# Patient Record
Sex: MALE | Race: BLACK OR AFRICAN AMERICAN | NOT HISPANIC OR LATINO
[De-identification: names, ages, dates, MRNs, and addresses within clinical notes are randomized per-mention and may not be internally consistent; named-entity substitution may affect disease eponyms.]

---

## 2023-01-30 ENCOUNTER — NON-APPOINTMENT (OUTPATIENT)
Age: 43
End: 2023-01-30

## 2023-01-30 ENCOUNTER — APPOINTMENT (OUTPATIENT)
Dept: HEART AND VASCULAR | Facility: CLINIC | Age: 43
End: 2023-01-30
Payer: COMMERCIAL

## 2023-01-30 VITALS
OXYGEN SATURATION: 98 % | RESPIRATION RATE: 16 BRPM | DIASTOLIC BLOOD PRESSURE: 80 MMHG | TEMPERATURE: 97.6 F | SYSTOLIC BLOOD PRESSURE: 130 MMHG | BODY MASS INDEX: 23.36 KG/M2 | HEIGHT: 74 IN | HEART RATE: 95 BPM | WEIGHT: 182 LBS

## 2023-01-30 DIAGNOSIS — Z86.79 PERSONAL HISTORY OF OTHER DISEASES OF THE CIRCULATORY SYSTEM: ICD-10-CM

## 2023-01-30 DIAGNOSIS — F17.200 NICOTINE DEPENDENCE, UNSPECIFIED, UNCOMPLICATED: ICD-10-CM

## 2023-01-30 DIAGNOSIS — Z83.3 FAMILY HISTORY OF DIABETES MELLITUS: ICD-10-CM

## 2023-01-30 DIAGNOSIS — Z86.39 PERSONAL HISTORY OF OTHER ENDOCRINE, NUTRITIONAL AND METABOLIC DISEASE: ICD-10-CM

## 2023-01-30 DIAGNOSIS — R06.09 OTHER FORMS OF DYSPNEA: ICD-10-CM

## 2023-01-30 DIAGNOSIS — Z86.2 PERSONAL HISTORY OF DISEASES OF THE BLOOD AND BLOOD-FORMING ORGANS AND CERTAIN DISORDERS INVOLVING THE IMMUNE MECHANISM: ICD-10-CM

## 2023-01-30 DIAGNOSIS — Z78.9 OTHER SPECIFIED HEALTH STATUS: ICD-10-CM

## 2023-01-30 DIAGNOSIS — Z82.49 FAMILY HISTORY OF ISCHEMIC HEART DISEASE AND OTHER DISEASES OF THE CIRCULATORY SYSTEM: ICD-10-CM

## 2023-01-30 DIAGNOSIS — U07.1 COVID-19: ICD-10-CM

## 2023-01-30 PROCEDURE — 99205 OFFICE O/P NEW HI 60 MIN: CPT

## 2023-01-30 PROCEDURE — 93000 ELECTROCARDIOGRAM COMPLETE: CPT

## 2023-01-30 PROCEDURE — 93246 EXT ECG>7D<15D RECORDING: CPT

## 2023-02-01 ENCOUNTER — NON-APPOINTMENT (OUTPATIENT)
Age: 43
End: 2023-02-01

## 2023-02-01 PROBLEM — Z86.79 HISTORY OF CARDIAC MURMUR: Status: RESOLVED | Noted: 2023-02-01 | Resolved: 2023-02-01

## 2023-02-01 PROBLEM — U07.1 COVID-19: Status: RESOLVED | Noted: 2023-02-01 | Resolved: 2023-02-01

## 2023-02-01 PROBLEM — Z86.2 HISTORY OF ANEMIA: Status: RESOLVED | Noted: 2023-02-01 | Resolved: 2023-02-01

## 2023-02-01 PROBLEM — Z86.39 HISTORY OF VITAMIN D DEFICIENCY: Status: RESOLVED | Noted: 2023-02-01 | Resolved: 2023-02-01

## 2023-02-01 PROBLEM — R06.09 DYSPNEA ON EXERTION: Status: ACTIVE | Noted: 2023-02-01

## 2023-02-01 NOTE — HISTORY OF PRESENT ILLNESS
[FreeTextEntry1] : Delfino Roberts is a 44 yo male who presents for CV evaluation.\par \par He has been experiencing "heart squeezing" sensation intermittently. He has also noted inc in VANG. He experiences palps ("hard beats" and "fast beats") post meals.  He denies pnd, orthopnea, edema, or loc.\par \par He is active (not recently).  He takes no prescribed medications (blackseed and sea gates supplement).  He uses marijuana frequently.\par \par ECG today reveals NSR.\par \par Clinical hx reviewed in detail.\par \par Recommendations:\par 1. hydrate\par 2. exercise\par 3. ZIO\par 4. EXSE\par 5. CPET\par 6. carotid duplex

## 2023-02-01 NOTE — DISCUSSION/SUMMARY
[Possible Cardiac Ischemia (Intermd Prob)] : possible cardiac ischemia (intermediate probability) [Non-Cardiac] : non-cardiac chest pain [Rhythm Disorder] : rhythm disorder [Stable] : stable [None] : none [Low Sodium Diet] : low sodium diet [Patient] : the patient [de-identified] : VANG

## 2023-02-08 PROCEDURE — 93248 EXT ECG>7D<15D REV&INTERPJ: CPT

## 2023-02-09 ENCOUNTER — NON-APPOINTMENT (OUTPATIENT)
Age: 43
End: 2023-02-09

## 2023-02-24 ENCOUNTER — NON-APPOINTMENT (OUTPATIENT)
Age: 43
End: 2023-02-24

## 2023-03-15 ENCOUNTER — APPOINTMENT (OUTPATIENT)
Dept: HEART AND VASCULAR | Facility: CLINIC | Age: 43
End: 2023-03-15
Payer: COMMERCIAL

## 2023-03-15 VITALS
OXYGEN SATURATION: 97 % | HEART RATE: 53 BPM | WEIGHT: 182 LBS | SYSTOLIC BLOOD PRESSURE: 124 MMHG | HEIGHT: 74 IN | DIASTOLIC BLOOD PRESSURE: 76 MMHG | BODY MASS INDEX: 23.36 KG/M2

## 2023-03-15 PROCEDURE — 93351 STRESS TTE COMPLETE: CPT

## 2023-03-15 PROCEDURE — 93880 EXTRACRANIAL BILAT STUDY: CPT

## 2023-03-15 PROCEDURE — 93320 DOPPLER ECHO COMPLETE: CPT

## 2023-03-15 PROCEDURE — 93325 DOPPLER ECHO COLOR FLOW MAPG: CPT

## 2023-03-28 ENCOUNTER — APPOINTMENT (OUTPATIENT)
Dept: HEART AND VASCULAR | Facility: CLINIC | Age: 43
End: 2023-03-28
Payer: COMMERCIAL

## 2023-03-28 VITALS
HEIGHT: 74 IN | SYSTOLIC BLOOD PRESSURE: 110 MMHG | HEART RATE: 75 BPM | BODY MASS INDEX: 23.1 KG/M2 | DIASTOLIC BLOOD PRESSURE: 70 MMHG | WEIGHT: 180 LBS

## 2023-03-28 PROCEDURE — 94010 BREATHING CAPACITY TEST: CPT | Mod: 59

## 2023-03-28 PROCEDURE — 94621 CARDIOPULM EXERCISE TESTING: CPT

## 2023-03-28 PROCEDURE — 94729 DIFFUSING CAPACITY: CPT

## 2023-03-28 PROCEDURE — 94727 GAS DIL/WSHOT DETER LNG VOL: CPT

## 2023-10-30 ENCOUNTER — APPOINTMENT (OUTPATIENT)
Dept: HEART AND VASCULAR | Facility: CLINIC | Age: 43
End: 2023-10-30
Payer: COMMERCIAL

## 2023-10-30 ENCOUNTER — LABORATORY RESULT (OUTPATIENT)
Age: 43
End: 2023-10-30

## 2023-10-30 VITALS
BODY MASS INDEX: 23.36 KG/M2 | SYSTOLIC BLOOD PRESSURE: 116 MMHG | RESPIRATION RATE: 16 BRPM | TEMPERATURE: 97.8 F | HEIGHT: 74 IN | HEART RATE: 80 BPM | WEIGHT: 182 LBS | OXYGEN SATURATION: 97 % | DIASTOLIC BLOOD PRESSURE: 66 MMHG

## 2023-10-30 DIAGNOSIS — R00.2 PALPITATIONS: ICD-10-CM

## 2023-10-30 DIAGNOSIS — Z00.00 ENCOUNTER FOR GENERAL ADULT MEDICAL EXAMINATION W/OUT ABNORMAL FINDINGS: ICD-10-CM

## 2023-10-30 PROCEDURE — 99215 OFFICE O/P EST HI 40 MIN: CPT

## 2023-10-30 RX ORDER — METOPROLOL SUCCINATE 50 MG/1
50 TABLET, EXTENDED RELEASE ORAL
Qty: 2 | Refills: 0 | Status: DISCONTINUED | COMMUNITY
Start: 2023-05-08 | End: 2023-10-30

## 2023-10-31 LAB
ALBUMIN SERPL ELPH-MCNC: 4.6 G/DL
ALP BLD-CCNC: 48 U/L
ALT SERPL-CCNC: 14 U/L
ANION GAP SERPL CALC-SCNC: 13 MMOL/L
AST SERPL-CCNC: 16 U/L
BASOPHILS # BLD AUTO: 0.06 K/UL
BASOPHILS NFR BLD AUTO: 0.9 %
BILIRUB SERPL-MCNC: 0.8 MG/DL
BUN SERPL-MCNC: 14 MG/DL
CALCIUM SERPL-MCNC: 10.1 MG/DL
CHLORIDE SERPL-SCNC: 103 MMOL/L
CHOLEST SERPL-MCNC: 172 MG/DL
CO2 SERPL-SCNC: 24 MMOL/L
CREAT SERPL-MCNC: 0.91 MG/DL
EGFR: 107 ML/MIN/1.73M2
EOSINOPHIL # BLD AUTO: 0.08 K/UL
EOSINOPHIL NFR BLD AUTO: 1.2 %
ESTIMATED AVERAGE GLUCOSE: 100 MG/DL
FERRITIN SERPL-MCNC: 297 NG/ML
GLUCOSE SERPL-MCNC: 92 MG/DL
HBA1C MFR BLD HPLC: 5.1 %
HCT VFR BLD CALC: 40.2 %
HDLC SERPL-MCNC: 49 MG/DL
HGB BLD-MCNC: 12.3 G/DL
IMM GRANULOCYTES NFR BLD AUTO: 0.3 %
IRON SATN MFR SERPL: 28 %
IRON SERPL-MCNC: 98 UG/DL
LDLC SERPL CALC-MCNC: 110 MG/DL
LYMPHOCYTES # BLD AUTO: 1.64 K/UL
LYMPHOCYTES NFR BLD AUTO: 24.9 %
MAN DIFF?: NORMAL
MCHC RBC-ENTMCNC: 21.7 PG
MCHC RBC-ENTMCNC: 30.6 GM/DL
MCV RBC AUTO: 70.9 FL
MONOCYTES # BLD AUTO: 0.45 K/UL
MONOCYTES NFR BLD AUTO: 6.8 %
NEUTROPHILS # BLD AUTO: 4.33 K/UL
NEUTROPHILS NFR BLD AUTO: 65.9 %
NONHDLC SERPL-MCNC: 122 MG/DL
PLATELET # BLD AUTO: 305 K/UL
POTASSIUM SERPL-SCNC: 3.9 MMOL/L
PROT SERPL-MCNC: 7.8 G/DL
RBC # BLD: 5.67 M/UL
RBC # FLD: 15.3 %
SODIUM SERPL-SCNC: 139 MMOL/L
T3FREE SERPL-MCNC: 2.59 PG/ML
T3RU NFR SERPL: 1.1 TBI
T4 SERPL-MCNC: 5.5 UG/DL
TIBC SERPL-MCNC: 344 UG/DL
TRIGL SERPL-MCNC: 64 MG/DL
TSH SERPL-ACNC: 1.16 UIU/ML
UIBC SERPL-MCNC: 246 UG/DL
WBC # FLD AUTO: 6.58 K/UL

## 2023-11-01 LAB — APO LP(A) SERPL-MCNC: 25 NMOL/L

## 2023-11-07 PROBLEM — R00.2 PALPITATIONS: Status: ACTIVE | Noted: 2023-02-01

## 2023-11-09 ENCOUNTER — NON-APPOINTMENT (OUTPATIENT)
Age: 43
End: 2023-11-09

## 2023-12-05 ENCOUNTER — RESULT REVIEW (OUTPATIENT)
Age: 43
End: 2023-12-05

## 2023-12-19 ENCOUNTER — NON-APPOINTMENT (OUTPATIENT)
Age: 43
End: 2023-12-19

## 2024-02-05 NOTE — HISTORY OF PRESENT ILLNESS
[FreeTextEntry1] : 44 year old male with a past medical history with NSVT, presents for evaluation and management of aberrant RCA with chest pain.  Patient is under the care of Dr. Virgil Morrissey.  Patient reports VANG, chest discomfort, and palpitations.   Exercise stress echo 3/15/23 EF 66%.aortic valve is tricuspid with normal structure without stenosis. The peak transaortic velocity is 1.11 m/s. no AI.   1.5 mm of horizontal ST segment depression in leads II, III and aVF, V4 and V5 during the peak stress period.   CTA heart 12/5/23  Cardiac:  1.  The calcium score is 0 Agatston units.  2.   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 The vessel has an acute-angle take-off, with a slit-like opening at the ostium, measuring 1.5 mm in diameter. The vessel takes an inter-arterial course to reach the right atrioventricular groove.  3.  No evidence of coronary atherosclerosis.   Non-cardiac:  12 mm homogeneously enhancing left hepatic lobe lesion. If clinically warranted, follow-up MR can be performed for further more specific characterization of this lesion.

## 2024-02-05 NOTE — DATA REVIEWED
[FreeTextEntry1] : Telemetry 1/2023: APC/PVC; SR/ST - sx: SVT CPET 3/2023: ischemic myocardial dysfxn; 63% predicted peak VO2 EXSE 3/2023: nl lv sys fxn; nl simon fxn; mild MR; 12:30 min Anup; pos ECG; no ischemia by WM Carotid Duplex 3/2023: min disease b/l

## 2024-02-07 ENCOUNTER — APPOINTMENT (OUTPATIENT)
Dept: CARDIOTHORACIC SURGERY | Facility: CLINIC | Age: 44
End: 2024-02-07
Payer: COMMERCIAL

## 2024-02-07 ENCOUNTER — NON-APPOINTMENT (OUTPATIENT)
Age: 44
End: 2024-02-07

## 2024-02-07 VITALS
HEART RATE: 87 BPM | RESPIRATION RATE: 16 BRPM | HEIGHT: 74 IN | WEIGHT: 175 LBS | DIASTOLIC BLOOD PRESSURE: 66 MMHG | SYSTOLIC BLOOD PRESSURE: 114 MMHG | BODY MASS INDEX: 22.46 KG/M2 | OXYGEN SATURATION: 97 %

## 2024-02-07 DIAGNOSIS — R07.89 OTHER CHEST PAIN: ICD-10-CM

## 2024-02-07 PROCEDURE — 99204 OFFICE O/P NEW MOD 45 MIN: CPT

## 2024-02-08 ENCOUNTER — APPOINTMENT (OUTPATIENT)
Dept: HEART AND VASCULAR | Facility: CLINIC | Age: 44
End: 2024-02-08
Payer: COMMERCIAL

## 2024-02-08 VITALS
WEIGHT: 175 LBS | DIASTOLIC BLOOD PRESSURE: 76 MMHG | BODY MASS INDEX: 22.46 KG/M2 | HEIGHT: 74 IN | RESPIRATION RATE: 16 BRPM | TEMPERATURE: 97.6 F | SYSTOLIC BLOOD PRESSURE: 110 MMHG | OXYGEN SATURATION: 98 % | HEART RATE: 85 BPM

## 2024-02-08 DIAGNOSIS — R94.39 ABNORMAL RESULT OF OTHER CARDIOVASCULAR FUNCTION STUDY: ICD-10-CM

## 2024-02-08 DIAGNOSIS — I25.5 ISCHEMIC CARDIOMYOPATHY: ICD-10-CM

## 2024-02-08 DIAGNOSIS — I47.10 SUPRAVENTRICULAR TACHYCARDIA, UNSPECIFIED: ICD-10-CM

## 2024-02-08 DIAGNOSIS — I34.0 NONRHEUMATIC MITRAL (VALVE) INSUFFICIENCY: ICD-10-CM

## 2024-02-08 DIAGNOSIS — I65.29 OCCLUSION AND STENOSIS OF UNSPECIFIED CAROTID ARTERY: ICD-10-CM

## 2024-02-08 PROBLEM — R07.89 CHEST PRESSURE: Status: ACTIVE | Noted: 2023-02-01

## 2024-02-08 PROCEDURE — 99215 OFFICE O/P EST HI 40 MIN: CPT

## 2024-02-08 RX ORDER — METOPROLOL SUCCINATE 100 MG/1
100 TABLET, EXTENDED RELEASE ORAL
Qty: 2 | Refills: 0 | Status: DISCONTINUED | COMMUNITY
Start: 2023-11-01 | End: 2024-02-08

## 2024-02-08 NOTE — PHYSICAL EXAM
[General Appearance - Alert] : alert [General Appearance - In No Acute Distress] : in no acute distress [Sclera] : the sclera and conjunctiva were normal [Outer Ear] : the ears and nose were normal in appearance [Neck Appearance] : the appearance of the neck was normal [] : the neck was supple [Jugular Venous Distention Increased] : there was no jugular-venous distention [Respiration, Rhythm And Depth] : normal respiratory rhythm and effort [Exaggerated Use Of Accessory Muscles For Inspiration] : no accessory muscle use [Auscultation Breath Sounds / Voice Sounds] : lungs were clear to auscultation bilaterally [Heart Rate And Rhythm] : heart rate was normal and rhythm regular [Heart Sounds] : normal S1 and S2 [Heart Sounds Gallop] : no gallops [Murmurs] : no murmurs [Examination Of The Chest] : the chest was normal in appearance [2+] : left 2+ [Bowel Sounds] : normal bowel sounds [Abdomen Soft] : soft [No CVA Tenderness] : no ~M costovertebral angle tenderness [Abnormal Walk] : normal gait [Skin Color & Pigmentation] : normal skin color and pigmentation [No Focal Deficits] : no focal deficits [Oriented To Time, Place, And Person] : oriented to person, place, and time

## 2024-02-11 PROBLEM — I47.10 NONSUSTAINED SUPRAVENTRICULAR TACHYCARDIA: Status: ACTIVE | Noted: 2023-03-28

## 2024-02-11 PROBLEM — R94.39 ABNORMAL STRESS ECG: Status: ACTIVE | Noted: 2023-03-28

## 2024-02-11 PROBLEM — I34.0 MILD MITRAL REGURGITATION: Status: ACTIVE | Noted: 2023-03-28

## 2024-02-11 PROBLEM — I65.29 CAROTID ARTERY PLAQUE: Status: ACTIVE | Noted: 2023-10-29

## 2024-02-11 PROBLEM — I25.5 ISCHEMIC MYOCARDIAL DYSFUNCTION: Status: ACTIVE | Noted: 2023-03-28

## 2024-02-11 NOTE — REASON FOR VISIT
[Structural Heart and Valve Disease] : structural heart and valve disease [Arrhythmia/ECG Abnorrmalities] : arrhythmia/ECG abnormalities [Hyperlipidemia] : hyperlipidemia [Coronary Artery Disease] : coronary artery disease [Carotid, Aortic and Peripheral Vascular Disease] : carotid, aortic and peripheral vascular disease [FreeTextEntry3] : Magnolia Rubio

## 2024-02-11 NOTE — DISCUSSION/SUMMARY
[Coronary Artery Disease] : coronary artery disease [Non-Cardiac] : non-cardiac symptoms [Possible Cardiac Ischemia (Intermd Prob)] : possible cardiac ischemia (intermediate probability) [Dietary Modification] : dietary modification [Exercise Regimen] : an exercise regimen [Hyperlipidemia] : hyperlipidemia [Diet Modification] : diet modification [Exercise] : exercise [Compensated] : compensated [Mild Mitral Regurgitation] : mild mitral regurgitation [Sodium Restriction] : sodium restriction [Sinus Tachycardia] : sinus tachycardia [PVCs] : ectopic ventricular beats [SVT] : paroxysmal supraventricular tachycardia [Stable] : stable [None] : There are no changes in medication management [Patient] : the patient [Exercise Rehab] : exercise rehabilitation [de-identified] : min carotid disease b/l [de-identified] : anomalous RCA

## 2024-02-11 NOTE — HISTORY OF PRESENT ILLNESS
[FreeTextEntry1] : Delfino Roberts returns for follow up.    The intermittent "heart squeezing" sensation is better.  The VANG and palps are improved.  He denies pnd, orthopnea, edema, or loc.  He is active.  He takes no prescribed medications (blackseed and sea gates supplement).  He uses marijuana frequently.  Telemetry 1/2023: APC/PVC; SR/ST - sx: SVT CPET 3/2023: ischemic myocardial dysfxn; 63% predicted peak VO2 EXSE 3/2023: nl lv sys fxn; nl simon fxn; mild MR; 12:30 min Anup; pos ECG; no ischemia by WM Carotid Duplex 3/2023: min disease b/l CTA 12/2023: calcium score 0; nl LAD/CX; anomalous RCA - L cusp take-off - slit like orifice and inter arterial course; 12 mm liver lesion  Clinical hx and results reviewed in detail.  Recommendations: 1. hydrate 2. exercise 3. will consider lipid lowering strategies; prevention and wellness counseling provided. 4. CTS eval with Dr Verde noted - encouraged pt to proceed with surgical intervention to address anomalous RCA. 5. primary care follow up re 12 mm liver lesion

## 2024-02-11 NOTE — PHYSICAL EXAM
[Well Nourished] : well nourished [Well Developed] : well developed [No Acute Distress] : no acute distress [Normal Conjunctiva] : normal conjunctiva [No Carotid Bruit] : no carotid bruit [Normal Venous Pressure] : normal venous pressure [Normal S1, S2] : normal S1, S2 [No Gallop] : no gallop [No Rub] : no rub [Clear Lung Fields] : clear lung fields [Murmur] : murmur [No Respiratory Distress] : no respiratory distress  [Good Air Entry] : good air entry [Non Tender] : non-tender [Soft] : abdomen soft [Normal Bowel Sounds] : normal bowel sounds [No Masses/organomegaly] : no masses/organomegaly [Normal Gait] : normal gait [No Cyanosis] : no cyanosis [No Edema] : no edema [No Varicosities] : no varicosities [No Clubbing] : no clubbing [No Skin Lesions] : no skin lesions [No Rash] : no rash [No Focal Deficits] : no focal deficits [Moves all extremities] : moves all extremities [Normal memory] : normal memory [Alert and Oriented] : alert and oriented [Normal Speech] : normal speech [de-identified] : soft sys murmur

## 2024-02-12 ENCOUNTER — NON-APPOINTMENT (OUTPATIENT)
Age: 44
End: 2024-02-12

## 2024-02-21 VITALS
WEIGHT: 175.05 LBS | TEMPERATURE: 98 F | SYSTOLIC BLOOD PRESSURE: 117 MMHG | DIASTOLIC BLOOD PRESSURE: 72 MMHG | RESPIRATION RATE: 16 BRPM | OXYGEN SATURATION: 98 % | HEART RATE: 63 BPM | HEIGHT: 74 IN

## 2024-02-21 RX ORDER — CHLORHEXIDINE GLUCONATE 213 G/1000ML
1 SOLUTION TOPICAL ONCE
Refills: 0 | Status: COMPLETED | OUTPATIENT
Start: 2024-02-26 | End: 2024-02-27

## 2024-02-21 NOTE — H&P ADULT - ASSESSMENT
44 YOM, current marijuana user, PMHx NSVT, HLD, anomalous RCA  who initially presented to outpatient cardiologist Dr. Morrissey  c/o an episode of chest pressure, palpitations, VANG in January 2023 during a trip in [place] after the patient used illicit drugs, drank strong coffee and had lack of sleep. Sx resolved after 1 hour on its own. Patient also reports that during times of exercise he feels chest pressure and palpitations. Patient was referred to Dr. Verde after incidental finding of anomalous RCA on CCTA. In light of patient's risk factors, abnormal CCTA results, and known anomalous RCA patient is referred to [facility] for pre-op cardiac catheterization w/ possible intervention if clinically indicated before scheduled surgery with Dr. Verde.       EKG: 	NSR 67bpm no ischemia		  ASA 	I  Mallampati class: III  Anginal Class: III     -No Known Allergies    -H/H = 11.6/38.8  . Pt denies BRBPR, hematuria, hematochezia, melena. No load as pt is planned for surgery   EF 66%. Euvolemic on exam. IV NS @ 250cc bolus followed by 75cc/hr x 2hrs started pre procedure    Sedation Plan:   Moderate  Patient Is Suitable Candidate For Sedation?     Yes    Risks & benefits of procedure and alternative therapy have been explained to the patient including but not limited to: allergic reaction, bleeding with possible need for blood transfusion, infection, renal and vascular compromise, limb damage, arrhythmia, stroke, vessel dissection/perforation, myocardial infarction, and emergent CABG. Informed consent obtained at bedside and included in chart.

## 2024-02-21 NOTE — H&P ADULT - NSICDXPASTMEDICALHX_GEN_ALL_CORE_FT
PAST MEDICAL HISTORY:  Anomalous right coronary artery     History of anemia     HLD (hyperlipidemia)

## 2024-02-21 NOTE — H&P ADULT - NSHPLABSRESULTS_GEN_ALL_CORE
11.6   6.81  )-----------( 272      ( 26 Feb 2024 07:48 )             38.8               PT/INR - ( 26 Feb 2024 07:48 )   PT: 9.8 sec;   INR: 0.86          PTT - ( 26 Feb 2024 07:48 )  PTT:32.8 sec

## 2024-02-21 NOTE — H&P ADULT - HISTORY OF PRESENT ILLNESS
Cards: Dr. Verde/ Dr. Virgil Morrissey   Pharmacy:   Escort:     *****PRE-OP******     Patient is a 44 YOM, current smoker and marijuana user, with pmhx of NSVT, HLD, anomalous RCA  who initially presented to outpatient cardiologist Dr. Morrissey  c/o an episode of chest pressure, palpitations, VANG in January 2023 during a trip in [place] after the patient used illicit drugs, drank strong coffee and had lack of sleep. Sx resolved after 1 hour on its own. Patient also reports that during times of exercise he feels chest pressure and palpitations. Patient was referred to Dr. Verde after incidental finding of anomalous RCA on CCTA. Today patient denies _____chest pain, SOB, VANG, palpitations, dizziness, LOC, N/V/D, fever/chills/sick contact, diaphoresis, orthopnea/PND, and leg swelling.    Exercise Stress Echo 3/15/23: EF 65%, aortic valve is tricuspid, 1.5mm ST depression in leads II, III and avF, V4 and V5 during peak stress period   CCTA 12/5/23: calcium score 0, RCA has an anomalous take off from the left coronary cusp, no evidence of coronary atherosclerosis   TTE 3/15/23: LVSF normal with EF 66%, mild MR, mildly dilated LA.     In light of patient's risk factors, abnormal CCTA results, and known anomalous RCA patient is referred to [facility] for pre-op cardiac catheterization w/ possible intervention if clinically indicated before scheduled surgery with Dr. Verde.        Cards: Dr. Verde/ Dr. Virgil Morrissey   Escort: pre-op -- will be admitted to [facility] post cath    *****PRE-OP******     44 YOM, current marijuana user, PMHx NSVT, HLD, anomalous RCA  who initially presented to outpatient cardiologist Dr. Morrissey  c/o an episode of chest pressure, palpitations, VANG in January 2023 during a trip in [place] after the patient used illicit drugs, drank strong coffee and had lack of sleep. Sx resolved after 1 hour on its own. Patient also reports that during times of exercise he feels chest pressure and palpitations. Patient was referred to Dr. Verde after incidental finding of anomalous RCA on CCTA. Today patient denies chest pain, SOB, VANG, palpitations, dizziness, LOC, N/V/D, fever/chills/sick contact, diaphoresis, orthopnea/PND, and leg swelling.    Exercise Stress Echo 3/15/23: EF 65%, aortic valve is tricuspid, 1.5mm ST depression in leads II, III and avF, V4 and V5 during peak stress period   CCTA 12/5/23: calcium score 0, RCA has an anomalous take off from the left coronary cusp, no evidence of coronary atherosclerosis   TTE 3/15/23: LVSF normal with EF 66%, mild MR, mildly dilated LA.     In light of patient's risk factors, abnormal CCTA results, and known anomalous RCA patient is referred to [facility] for pre-op cardiac catheterization w/ possible intervention if clinically indicated before scheduled surgery with Dr. Verde.

## 2024-02-25 ENCOUNTER — TRANSCRIPTION ENCOUNTER (OUTPATIENT)
Age: 44
End: 2024-02-25

## 2024-02-26 ENCOUNTER — INPATIENT (INPATIENT)
Facility: HOSPITAL | Age: 44
LOS: 4 days | Discharge: HOME | DRG: 253 | End: 2024-03-02
Attending: THORACIC SURGERY (CARDIOTHORACIC VASCULAR SURGERY) | Admitting: THORACIC SURGERY (CARDIOTHORACIC VASCULAR SURGERY)
Payer: COMMERCIAL

## 2024-02-26 LAB
A1C WITH ESTIMATED AVERAGE GLUCOSE RESULT: 4.7 % — SIGNIFICANT CHANGE UP (ref 4–5.6)
ADD ON TEST-SPECIMEN IN LAB: SIGNIFICANT CHANGE UP
ADD ON TEST-SPECIMEN IN LAB: SIGNIFICANT CHANGE UP
ALBUMIN SERPL ELPH-MCNC: 4.4 G/DL — SIGNIFICANT CHANGE UP (ref 3.3–5)
ALP SERPL-CCNC: 62 U/L — SIGNIFICANT CHANGE UP (ref 40–120)
ALT FLD-CCNC: 23 U/L — SIGNIFICANT CHANGE UP (ref 10–45)
AMPHET UR-MCNC: NEGATIVE — SIGNIFICANT CHANGE UP
ANION GAP SERPL CALC-SCNC: 11 MMOL/L — SIGNIFICANT CHANGE UP (ref 5–17)
ANISOCYTOSIS BLD QL: SLIGHT — SIGNIFICANT CHANGE UP
APPEARANCE UR: CLEAR — SIGNIFICANT CHANGE UP
APTT BLD: 32.8 SEC — SIGNIFICANT CHANGE UP (ref 24.5–35.6)
AST SERPL-CCNC: 17 U/L — SIGNIFICANT CHANGE UP (ref 10–40)
BARBITURATES UR SCN-MCNC: NEGATIVE — SIGNIFICANT CHANGE UP
BASOPHILS # BLD AUTO: 0 K/UL — SIGNIFICANT CHANGE UP (ref 0–0.2)
BASOPHILS NFR BLD AUTO: 0 % — SIGNIFICANT CHANGE UP (ref 0–2)
BENZODIAZ UR-MCNC: POSITIVE
BILIRUB SERPL-MCNC: 0.5 MG/DL — SIGNIFICANT CHANGE UP (ref 0.2–1.2)
BILIRUB UR-MCNC: NEGATIVE — SIGNIFICANT CHANGE UP
BLD GP AB SCN SERPL QL: NEGATIVE — SIGNIFICANT CHANGE UP
BUN SERPL-MCNC: 13 MG/DL — SIGNIFICANT CHANGE UP (ref 7–23)
CALCIUM SERPL-MCNC: 9.7 MG/DL — SIGNIFICANT CHANGE UP (ref 8.4–10.5)
CHLORIDE SERPL-SCNC: 105 MMOL/L — SIGNIFICANT CHANGE UP (ref 96–108)
CHOLEST SERPL-MCNC: 164 MG/DL — SIGNIFICANT CHANGE UP
CK MB CFR SERPL CALC: 1 NG/ML — SIGNIFICANT CHANGE UP (ref 0–6.7)
CK SERPL-CCNC: 123 U/L — SIGNIFICANT CHANGE UP (ref 30–200)
CO2 SERPL-SCNC: 25 MMOL/L — SIGNIFICANT CHANGE UP (ref 22–31)
COCAINE METAB.OTHER UR-MCNC: NEGATIVE — SIGNIFICANT CHANGE UP
COLOR SPEC: YELLOW — SIGNIFICANT CHANGE UP
CREAT SERPL-MCNC: 1.03 MG/DL — SIGNIFICANT CHANGE UP (ref 0.5–1.3)
DIFF PNL FLD: NEGATIVE — SIGNIFICANT CHANGE UP
EGFR: 92 ML/MIN/1.73M2 — SIGNIFICANT CHANGE UP
EOSINOPHIL # BLD AUTO: 0.18 K/UL — SIGNIFICANT CHANGE UP (ref 0–0.5)
EOSINOPHIL NFR BLD AUTO: 2.6 % — SIGNIFICANT CHANGE UP (ref 0–6)
ESTIMATED AVERAGE GLUCOSE: 88 MG/DL — SIGNIFICANT CHANGE UP (ref 68–114)
GLUCOSE SERPL-MCNC: 105 MG/DL — HIGH (ref 70–99)
GLUCOSE UR QL: NEGATIVE MG/DL — SIGNIFICANT CHANGE UP
HCT VFR BLD CALC: 38.8 % — LOW (ref 39–50)
HDLC SERPL-MCNC: 40 MG/DL — LOW
HGB BLD-MCNC: 11.6 G/DL — LOW (ref 13–17)
HYPOCHROMIA BLD QL: SLIGHT — SIGNIFICANT CHANGE UP
INR BLD: 0.86 — SIGNIFICANT CHANGE UP (ref 0.85–1.18)
KETONES UR-MCNC: NEGATIVE MG/DL — SIGNIFICANT CHANGE UP
LEUKOCYTE ESTERASE UR-ACNC: NEGATIVE — SIGNIFICANT CHANGE UP
LIPID PNL WITH DIRECT LDL SERPL: 106 MG/DL — HIGH
LYMPHOCYTES # BLD AUTO: 1.01 K/UL — SIGNIFICANT CHANGE UP (ref 1–3.3)
LYMPHOCYTES # BLD AUTO: 14.8 % — SIGNIFICANT CHANGE UP (ref 13–44)
MAGNESIUM SERPL-MCNC: 2.1 MG/DL — SIGNIFICANT CHANGE UP (ref 1.6–2.6)
MANUAL SMEAR VERIFICATION: SIGNIFICANT CHANGE UP
MCHC RBC-ENTMCNC: 21.5 PG — LOW (ref 27–34)
MCHC RBC-ENTMCNC: 29.9 GM/DL — LOW (ref 32–36)
MCV RBC AUTO: 71.9 FL — LOW (ref 80–100)
METHADONE UR-MCNC: NEGATIVE — SIGNIFICANT CHANGE UP
MICROCYTES BLD QL: SLIGHT — SIGNIFICANT CHANGE UP
MONOCYTES # BLD AUTO: 0.24 K/UL — SIGNIFICANT CHANGE UP (ref 0–0.9)
MONOCYTES NFR BLD AUTO: 3.5 % — SIGNIFICANT CHANGE UP (ref 2–14)
NEUTROPHILS # BLD AUTO: 5.39 K/UL — SIGNIFICANT CHANGE UP (ref 1.8–7.4)
NEUTROPHILS NFR BLD AUTO: 79.1 % — HIGH (ref 43–77)
NITRITE UR-MCNC: NEGATIVE — SIGNIFICANT CHANGE UP
NON HDL CHOLESTEROL: 124 MG/DL — SIGNIFICANT CHANGE UP
NT-PROBNP SERPL-SCNC: <36 PG/ML — SIGNIFICANT CHANGE UP (ref 0–300)
OPIATES UR-MCNC: NEGATIVE — SIGNIFICANT CHANGE UP
OVALOCYTES BLD QL SMEAR: SLIGHT — SIGNIFICANT CHANGE UP
PCP SPEC-MCNC: SIGNIFICANT CHANGE UP
PCP UR-MCNC: NEGATIVE — SIGNIFICANT CHANGE UP
PH UR: 6.5 — SIGNIFICANT CHANGE UP (ref 5–8)
PLAT MORPH BLD: NORMAL — SIGNIFICANT CHANGE UP
PLATELET # BLD AUTO: 272 K/UL — SIGNIFICANT CHANGE UP (ref 150–400)
POIKILOCYTOSIS BLD QL AUTO: SIGNIFICANT CHANGE UP
POLYCHROMASIA BLD QL SMEAR: SLIGHT — SIGNIFICANT CHANGE UP
POTASSIUM SERPL-MCNC: 4.5 MMOL/L — SIGNIFICANT CHANGE UP (ref 3.5–5.3)
POTASSIUM SERPL-SCNC: 4.5 MMOL/L — SIGNIFICANT CHANGE UP (ref 3.5–5.3)
PROT SERPL-MCNC: 7.3 G/DL — SIGNIFICANT CHANGE UP (ref 6–8.3)
PROT UR-MCNC: NEGATIVE MG/DL — SIGNIFICANT CHANGE UP
PROTHROM AB SERPL-ACNC: 9.8 SEC — SIGNIFICANT CHANGE UP (ref 9.5–13)
RBC # BLD: 5.4 M/UL — SIGNIFICANT CHANGE UP (ref 4.2–5.8)
RBC # FLD: 14.2 % — SIGNIFICANT CHANGE UP (ref 10.3–14.5)
RBC BLD AUTO: ABNORMAL
RH IG SCN BLD-IMP: POSITIVE — SIGNIFICANT CHANGE UP
SODIUM SERPL-SCNC: 141 MMOL/L — SIGNIFICANT CHANGE UP (ref 135–145)
SP GR SPEC: >1.03 — HIGH (ref 1–1.03)
STOMATOCYTES BLD QL SMEAR: SIGNIFICANT CHANGE UP
THC UR QL: POSITIVE
TRIGL SERPL-MCNC: 90 MG/DL — SIGNIFICANT CHANGE UP
TSH SERPL-MCNC: 1.77 UIU/ML — SIGNIFICANT CHANGE UP (ref 0.27–4.2)
UROBILINOGEN FLD QL: 0.2 MG/DL — SIGNIFICANT CHANGE UP (ref 0.2–1)
WBC # BLD: 6.81 K/UL — SIGNIFICANT CHANGE UP (ref 3.8–10.5)
WBC # FLD AUTO: 6.81 K/UL — SIGNIFICANT CHANGE UP (ref 3.8–10.5)

## 2024-02-26 PROCEDURE — 93010 ELECTROCARDIOGRAM REPORT: CPT

## 2024-02-26 PROCEDURE — 71046 X-RAY EXAM CHEST 2 VIEWS: CPT | Mod: 26

## 2024-02-26 PROCEDURE — 93458 L HRT ARTERY/VENTRICLE ANGIO: CPT | Mod: 26

## 2024-02-26 PROCEDURE — 94010 BREATHING CAPACITY TEST: CPT | Mod: 26

## 2024-02-26 PROCEDURE — 93880 EXTRACRANIAL BILAT STUDY: CPT | Mod: 26

## 2024-02-26 PROCEDURE — 99152 MOD SED SAME PHYS/QHP 5/>YRS: CPT

## 2024-02-26 RX ORDER — METOPROLOL TARTRATE 50 MG
1 TABLET ORAL
Refills: 0 | DISCHARGE

## 2024-02-26 RX ORDER — SODIUM CHLORIDE 9 MG/ML
1000 INJECTION INTRAMUSCULAR; INTRAVENOUS; SUBCUTANEOUS
Refills: 0 | Status: DISCONTINUED | OUTPATIENT
Start: 2024-02-26 | End: 2024-02-28

## 2024-02-26 RX ORDER — SENNA PLUS 8.6 MG/1
2 TABLET ORAL AT BEDTIME
Refills: 0 | Status: DISCONTINUED | OUTPATIENT
Start: 2024-02-26 | End: 2024-02-27

## 2024-02-26 RX ORDER — CHLORHEXIDINE GLUCONATE 213 G/1000ML
1 SOLUTION TOPICAL ONCE
Refills: 0 | Status: COMPLETED | OUTPATIENT
Start: 2024-02-27 | End: 2024-02-27

## 2024-02-26 RX ORDER — POLYETHYLENE GLYCOL 3350 17 G/17G
17 POWDER, FOR SOLUTION ORAL DAILY
Refills: 0 | Status: DISCONTINUED | OUTPATIENT
Start: 2024-02-26 | End: 2024-02-27

## 2024-02-26 RX ORDER — CHLORHEXIDINE GLUCONATE 213 G/1000ML
10 SOLUTION TOPICAL ONCE
Refills: 0 | Status: COMPLETED | OUTPATIENT
Start: 2024-02-26 | End: 2024-02-27

## 2024-02-26 RX ORDER — SODIUM CHLORIDE 9 MG/ML
3 INJECTION INTRAMUSCULAR; INTRAVENOUS; SUBCUTANEOUS EVERY 8 HOURS
Refills: 0 | Status: DISCONTINUED | OUTPATIENT
Start: 2024-02-26 | End: 2024-02-27

## 2024-02-26 RX ORDER — SODIUM CHLORIDE 9 MG/ML
250 INJECTION INTRAMUSCULAR; INTRAVENOUS; SUBCUTANEOUS ONCE
Refills: 0 | Status: COMPLETED | OUTPATIENT
Start: 2024-02-26 | End: 2024-02-26

## 2024-02-26 RX ORDER — ACETAMINOPHEN 500 MG
650 TABLET ORAL EVERY 6 HOURS
Refills: 0 | Status: DISCONTINUED | OUTPATIENT
Start: 2024-02-26 | End: 2024-02-27

## 2024-02-26 RX ORDER — PANTOPRAZOLE SODIUM 20 MG/1
40 TABLET, DELAYED RELEASE ORAL
Refills: 0 | Status: DISCONTINUED | OUTPATIENT
Start: 2024-02-26 | End: 2024-02-27

## 2024-02-26 RX ORDER — HEPARIN SODIUM 5000 [USP'U]/ML
5000 INJECTION INTRAVENOUS; SUBCUTANEOUS EVERY 8 HOURS
Refills: 0 | Status: DISCONTINUED | OUTPATIENT
Start: 2024-02-26 | End: 2024-02-27

## 2024-02-26 RX ORDER — CHLORHEXIDINE GLUCONATE 213 G/1000ML
1 SOLUTION TOPICAL ONCE
Refills: 0 | Status: COMPLETED | OUTPATIENT
Start: 2024-02-26 | End: 2024-02-26

## 2024-02-26 RX ADMIN — SODIUM CHLORIDE 500 MILLILITER(S): 9 INJECTION INTRAMUSCULAR; INTRAVENOUS; SUBCUTANEOUS at 08:55

## 2024-02-26 RX ADMIN — HEPARIN SODIUM 5000 UNIT(S): 5000 INJECTION INTRAVENOUS; SUBCUTANEOUS at 21:53

## 2024-02-26 RX ADMIN — CHLORHEXIDINE GLUCONATE 1 APPLICATION(S): 213 SOLUTION TOPICAL at 21:57

## 2024-02-26 RX ADMIN — SODIUM CHLORIDE 3 MILLILITER(S): 9 INJECTION INTRAMUSCULAR; INTRAVENOUS; SUBCUTANEOUS at 20:40

## 2024-02-26 RX ADMIN — CHLORHEXIDINE GLUCONATE 1 APPLICATION(S): 213 SOLUTION TOPICAL at 20:15

## 2024-02-26 RX ADMIN — SODIUM CHLORIDE 75 MILLILITER(S): 9 INJECTION INTRAMUSCULAR; INTRAVENOUS; SUBCUTANEOUS at 09:40

## 2024-02-26 RX ADMIN — SODIUM CHLORIDE 225 MILLILITER(S): 9 INJECTION INTRAMUSCULAR; INTRAVENOUS; SUBCUTANEOUS at 13:36

## 2024-02-26 NOTE — PRE-ANESTHESIA EVALUATION ADULT - NSANTHPMHFT_GEN_ALL_CORE
44 YOM, current marijuana user, PMHx NSVT, HLD, anomalous RCA.     Episode of chest pressure, palpitations, VANG in January 2023 during a trip in [place] after the patient used illicit drugs, drank strong coffee and had lack of sleep. Sx resolved after 1 hour on its own. Patient also reports that during times of exercise he feels chest pressure and palpitations.     Incidental finding of anomalous RCA on CCTA.     Exercise Stress Echo 3/15/23: EF 65%, aortic valve is tricuspid, 1.5mm ST depression in leads II, III and avF, V4 and V5 during peak stress period     CCTA 12/5/23: calcium score 0, RCA has an anomalous take off from the left coronary cusp, no evidence of coronary atherosclerosis     TTE 3/15/23: LVSF normal with EF 66%, mild MR, mildly dilated LA.            Allergies:  	No Known Allergies      PAST MEDICAL HISTORY:  Anomalous right coronary artery   History of anemia   HLD (hyperlipidemia).     PAST SURGICAL HISTORY:  No significant past surgical history.

## 2024-02-26 NOTE — PROGRESS NOTE ADULT - SUBJECTIVE AND OBJECTIVE BOX
OPERATION & DATE: preop transposition of the RCA    SUBJECTIVE ASSESSMENT:  44yoM seen and examined. Patient seen in cath lab after procedure.    VITAL SIGNS:  Vital Signs Last 24 Hrs  T(C): --  T(F): --  HR: --  BP: --  BP(mean): --  RR: --  SpO2: --      I&O's Detail    PHYSICAL EXAM:  General:  HEENT:  Cardio:  Pulm:  GI:  Extremities:  Vascular:  Incisions:    LABS:                        11.6   6.81  )-----------( 272      ( 26 Feb 2024 07:48 )             38.8     PT/INR - ( 26 Feb 2024 07:48 )   PT: 9.8 sec;   INR: 0.86          PTT - ( 26 Feb 2024 07:48 )  PTT:32.8 sec  02-26    141  |  105  |  13  ----------------------------<  105<H>  4.5   |  25  |  1.03    Ca    9.7      26 Feb 2024 07:47  Mg     2.1     02-26    TPro  7.3  /  Alb  4.4  /  TBili  0.5  /  DBili  x   /  AST  17  /  ALT  23  /  AlkPhos  62  02-26    Urinalysis Basic - ( 26 Feb 2024 07:47 )    Color: x / Appearance: x / SG: x / pH: x  Gluc: 105 mg/dL / Ketone: x  / Bili: x / Urobili: x   Blood: x / Protein: x / Nitrite: x   Leuk Esterase: x / RBC: x / WBC x   Sq Epi: x / Non Sq Epi: x / Bacteria: x      MEDICATIONS  (STANDING):  chlorhexidine 4% Liquid 1 Application(s) Topical once  sodium chloride 0.9%. 1000 milliLiter(s) (75 mL/Hr) IV Continuous <Continuous>  sodium chloride 0.9%. 1000 milliLiter(s) (225 mL/Hr) IV Continuous <Continuous>    MEDICATIONS  (PRN):    RADIOLOGY & ADDITIONAL TESTS:     OPERATION & DATE: preop transposition of the RCA    SUBJECTIVE ASSESSMENT:  44yoM seen and examined. Patient seen in after cath lab procedure. Feels well, has no complaints. Denies CP, palpitations, SOB, abdominal pain, nausea, vomiting, fever, chills.    VITAL SIGNS:  Vital Signs Last 24 Hrs  T(C): --  T(F): --  HR: --  BP: --  BP(mean): --  RR: --  SpO2: --      I&O's Detail    PHYSICAL EXAM:  General: NAD, sitting comfortably in bed, conversing appropriately  Neurological: alert and oriented, UE and LE strength equal b/l, facial symmetry present  Cardiovascular: RRR, Clear S1 and S2, no murmurs appreciated  Respiratory: chest expansion symmetrical, CTA b/l, no wheezing noted  Gastrointestinal: +BS, soft, NT, ND  Extremities: moving spontaneously, no calf tenderness or edema.  Vascular: warm, well perfused. DP/PT pulses palpable b/l.  Incisions: R radial band in place    LABS:                        11.6   6.81  )-----------( 272      ( 26 Feb 2024 07:48 )             38.8     PT/INR - ( 26 Feb 2024 07:48 )   PT: 9.8 sec;   INR: 0.86          PTT - ( 26 Feb 2024 07:48 )  PTT:32.8 sec  02-26    141  |  105  |  13  ----------------------------<  105<H>  4.5   |  25  |  1.03    Ca    9.7      26 Feb 2024 07:47  Mg     2.1     02-26    TPro  7.3  /  Alb  4.4  /  TBili  0.5  /  DBili  x   /  AST  17  /  ALT  23  /  AlkPhos  62  02-26    Urinalysis Basic - ( 26 Feb 2024 07:47 )    Color: x / Appearance: x / SG: x / pH: x  Gluc: 105 mg/dL / Ketone: x  / Bili: x / Urobili: x   Blood: x / Protein: x / Nitrite: x   Leuk Esterase: x / RBC: x / WBC x   Sq Epi: x / Non Sq Epi: x / Bacteria: x      MEDICATIONS  (STANDING):  chlorhexidine 4% Liquid 1 Application(s) Topical once  sodium chloride 0.9%. 1000 milliLiter(s) (75 mL/Hr) IV Continuous <Continuous>  sodium chloride 0.9%. 1000 milliLiter(s) (225 mL/Hr) IV Continuous <Continuous>    MEDICATIONS  (PRN):    RADIOLOGY & ADDITIONAL TESTS:

## 2024-02-26 NOTE — PROGRESS NOTE ADULT - ASSESSMENT
44 yoM, current marijuana user, PMHx NSVT, HLD, anomalous RCA who initially presented to outpatient cardiologist, Dr. Morrissey, c/o an episode of chest pressure, palpitations, VANG in January 2023 during a trip in [place] after the patient used illicit drugs, drank strong coffee and had lack of sleep. Patient also reports that during times of exercise he feels chest pressure and palpitations. Patient was referred to Dr. Verde after incidental finding of anomalous RCA on CCTA. Exercise Stress Echo 3/15/23: EF 65%, aortic valve is tricuspid, 1.5mm ST depression in leads II, III and avF, V4 and V5 during peak stress period. CCTA 12/5/23: calcium score 0, RCA has an anomalous take off from the left coronary cusp, no evidence of coronary atherosclerosis. TTE 3/15/23: LVSF normal with EF 66%, mild MR, mildly dilated LA. He underwent cardiac cath on 2/26/24 which revealed normal coronaries. He is preop for transposition of the RCA tomorrow with Dr. Verde.     Plan:    Neurovascular:   -Pain well controlled with current regimen. PRN's: tylenol    Cardiovascular:   anomalous RCA, preop tomorrow for transposition of RCA  -pending PFTs, CXR, EKG, carotid US  -cardiac cath 2/26 w/ normal coronaries  -continue home metoprolol  -Hemodynamically stable.   -Monitor: BP, HR, tele    Respiratory:   -Oxygenating well on room air  -Encourage continued use of IS 10x/hr and frequent ambulation  -CXR: pending    GI:  -GI PPX: continue protonix  -PO Diet  -Bowel Regimen: senna/miralax    Renal / :  -Continue to monitor renal function: BUN/Cr: 13/1.03  -Monitor I/O's daily     Endocrine:    -No hx of DM or thyroid dx  -A1c: 4.7  -TSH: pending    Hematologic:  -CBC: H/H- 11.6/38.8  -Coagulation Panel.    ID:  -Temperature: afebrile   -CBC: WBC- 6.81    Prophylaxis:  -DVT prophylaxis with 5000 SubQ Heparin q8h.  -Continue with SCD's b/l while patient is at rest     Disposition:  -OR tomorrow 44 yoM, current marijuana user, PMHx NSVT, HLD, anomalous RCA who initially presented to outpatient cardiologist, Dr. Morrissey, c/o an episode of chest pressure, palpitations, VANG in January 2023 during a trip in [place] after the patient used illicit drugs, drank strong coffee and had lack of sleep. Patient also reports that during times of exercise he feels chest pressure and palpitations. Patient was referred to Dr. Verde after incidental finding of anomalous RCA on CCTA. Exercise Stress Echo 3/15/23: EF 65%, aortic valve is tricuspid, 1.5mm ST depression in leads II, III and avF, V4 and V5 during peak stress period. CCTA 12/5/23: calcium score 0, RCA has an anomalous take off from the left coronary cusp, no evidence of coronary atherosclerosis. TTE 3/15/23: LVSF normal with EF 66%, mild MR, mildly dilated LA. He underwent cardiac cath on 2/26/24 which revealed normal coronaries. He is preop for transposition of the RCA tomorrow with Dr. Verde.     Plan:    Neurovascular:   -Pain well controlled with current regimen. PRN's: tylenol    Cardiovascular:   anomalous RCA, preop tomorrow for transposition of RCA  -pending PFTs, CXR, EKG, carotid US  -cardiac cath 2/26 w/ normal coronaries  -Hemodynamically stable.   -Monitor: BP, HR, tele    Respiratory:   -Oxygenating well on room air  -Encourage continued use of IS 10x/hr and frequent ambulation  -CXR: pending    GI:  -GI PPX: continue protonix  -PO Diet  -Bowel Regimen: senna/miralax    Renal / :  -Continue to monitor renal function: BUN/Cr: 13/1.03  -Monitor I/O's daily     Endocrine:    -No hx of DM or thyroid dx  -A1c: 4.7  -TSH: pending    Hematologic:  -CBC: H/H- 11.6/38.8  -Coagulation Panel.    ID:  -Temperature: afebrile   -CBC: WBC- 6.81    Prophylaxis:  -DVT prophylaxis with 5000 SubQ Heparin q8h.  -Continue with SCD's b/l while patient is at rest     Disposition:  -OR tomorrow

## 2024-02-26 NOTE — PATIENT PROFILE ADULT - PATIENT'S GENDER IDENTITY
Pt withdrawn to room, but is pleasant and bright upon approach  Pt continues to demonstrate disorganized thoughts  When approached with medications pt asked "Is there any Depakote in this cup? I'm not taking that since that's the only one I can refuse and you can't give me an injection of " Pt denies SI/HI/AVH and reports good sleep and appetite  Will monitor  Male

## 2024-02-27 ENCOUNTER — APPOINTMENT (OUTPATIENT)
Dept: CARDIOTHORACIC SURGERY | Facility: HOSPITAL | Age: 44
End: 2024-02-27

## 2024-02-27 LAB
ALBUMIN SERPL ELPH-MCNC: 3.2 G/DL — LOW (ref 3.3–5)
ALBUMIN SERPL ELPH-MCNC: 3.6 G/DL — SIGNIFICANT CHANGE UP (ref 3.3–5)
ALBUMIN SERPL ELPH-MCNC: 3.7 G/DL — SIGNIFICANT CHANGE UP (ref 3.3–5)
ALP SERPL-CCNC: 39 U/L — LOW (ref 40–120)
ALP SERPL-CCNC: 40 U/L — SIGNIFICANT CHANGE UP (ref 40–120)
ALP SERPL-CCNC: 41 U/L — SIGNIFICANT CHANGE UP (ref 40–120)
ALT FLD-CCNC: 16 U/L — SIGNIFICANT CHANGE UP (ref 10–45)
ALT FLD-CCNC: 16 U/L — SIGNIFICANT CHANGE UP (ref 10–45)
ALT FLD-CCNC: 17 U/L — SIGNIFICANT CHANGE UP (ref 10–45)
ANION GAP SERPL CALC-SCNC: 10 MMOL/L — SIGNIFICANT CHANGE UP (ref 5–17)
ANION GAP SERPL CALC-SCNC: 6 MMOL/L — SIGNIFICANT CHANGE UP (ref 5–17)
ANION GAP SERPL CALC-SCNC: 7 MMOL/L — SIGNIFICANT CHANGE UP (ref 5–17)
ANION GAP SERPL CALC-SCNC: 8 MMOL/L — SIGNIFICANT CHANGE UP (ref 5–17)
APTT BLD: 28.3 SEC — SIGNIFICANT CHANGE UP (ref 24.5–35.6)
APTT BLD: 31.6 SEC — SIGNIFICANT CHANGE UP (ref 24.5–35.6)
APTT BLD: 33.2 SEC — SIGNIFICANT CHANGE UP (ref 24.5–35.6)
AST SERPL-CCNC: 22 U/L — SIGNIFICANT CHANGE UP (ref 10–40)
AST SERPL-CCNC: 25 U/L — SIGNIFICANT CHANGE UP (ref 10–40)
AST SERPL-CCNC: 29 U/L — SIGNIFICANT CHANGE UP (ref 10–40)
BASE EXCESS BLDA CALC-SCNC: 0.7 MMOL/L — SIGNIFICANT CHANGE UP (ref -2–3)
BASE EXCESS BLDA CALC-SCNC: 2.1 MMOL/L — SIGNIFICANT CHANGE UP (ref -2–3)
BASE EXCESS BLDA CALC-SCNC: 3.2 MMOL/L — HIGH (ref -2–3)
BASE EXCESS BLDA CALC-SCNC: 4.8 MMOL/L — HIGH (ref -2–3)
BASE EXCESS BLDV CALC-SCNC: -2.7 MMOL/L — LOW (ref -2–3)
BASE EXCESS BLDV CALC-SCNC: 1 MMOL/L — SIGNIFICANT CHANGE UP (ref -2–3)
BILIRUB SERPL-MCNC: 0.7 MG/DL — SIGNIFICANT CHANGE UP (ref 0.2–1.2)
BILIRUB SERPL-MCNC: 0.7 MG/DL — SIGNIFICANT CHANGE UP (ref 0.2–1.2)
BILIRUB SERPL-MCNC: 0.9 MG/DL — SIGNIFICANT CHANGE UP (ref 0.2–1.2)
BUN SERPL-MCNC: 10 MG/DL — SIGNIFICANT CHANGE UP (ref 7–23)
BUN SERPL-MCNC: 11 MG/DL — SIGNIFICANT CHANGE UP (ref 7–23)
BUN SERPL-MCNC: 14 MG/DL — SIGNIFICANT CHANGE UP (ref 7–23)
BUN SERPL-MCNC: 9 MG/DL — SIGNIFICANT CHANGE UP (ref 7–23)
CA-I BLDA-SCNC: 0.98 MMOL/L — LOW (ref 1.15–1.33)
CA-I BLDA-SCNC: 1.21 MMOL/L — SIGNIFICANT CHANGE UP (ref 1.15–1.33)
CA-I BLDA-SCNC: 1.22 MMOL/L — SIGNIFICANT CHANGE UP (ref 1.15–1.33)
CA-I BLDA-SCNC: 1.39 MMOL/L — HIGH (ref 1.15–1.33)
CA-I SERPL-SCNC: 1 MMOL/L — LOW (ref 1.15–1.33)
CALCIUM SERPL-MCNC: 10 MG/DL — SIGNIFICANT CHANGE UP (ref 8.4–10.5)
CALCIUM SERPL-MCNC: 8.5 MG/DL — SIGNIFICANT CHANGE UP (ref 8.4–10.5)
CALCIUM SERPL-MCNC: 9.1 MG/DL — SIGNIFICANT CHANGE UP (ref 8.4–10.5)
CALCIUM SERPL-MCNC: 9.4 MG/DL — SIGNIFICANT CHANGE UP (ref 8.4–10.5)
CHLORIDE SERPL-SCNC: 101 MMOL/L — SIGNIFICANT CHANGE UP (ref 96–108)
CHLORIDE SERPL-SCNC: 105 MMOL/L — SIGNIFICANT CHANGE UP (ref 96–108)
CHLORIDE SERPL-SCNC: 105 MMOL/L — SIGNIFICANT CHANGE UP (ref 96–108)
CHLORIDE SERPL-SCNC: 107 MMOL/L — SIGNIFICANT CHANGE UP (ref 96–108)
CO2 BLDA-SCNC: 27 MMOL/L — HIGH (ref 19–24)
CO2 BLDA-SCNC: 29 MMOL/L — HIGH (ref 19–24)
CO2 BLDA-SCNC: 29 MMOL/L — HIGH (ref 19–24)
CO2 BLDA-SCNC: 30 MMOL/L — HIGH (ref 19–24)
CO2 BLDV-SCNC: 26.1 MMOL/L — HIGH (ref 22–26)
CO2 BLDV-SCNC: 30.6 MMOL/L — HIGH (ref 22–26)
CO2 SERPL-SCNC: 24 MMOL/L — SIGNIFICANT CHANGE UP (ref 22–31)
CO2 SERPL-SCNC: 25 MMOL/L — SIGNIFICANT CHANGE UP (ref 22–31)
CO2 SERPL-SCNC: 25 MMOL/L — SIGNIFICANT CHANGE UP (ref 22–31)
CO2 SERPL-SCNC: 29 MMOL/L — SIGNIFICANT CHANGE UP (ref 22–31)
COHGB MFR BLDA: 1.2 % — SIGNIFICANT CHANGE UP
COHGB MFR BLDA: 1.2 % — SIGNIFICANT CHANGE UP
COHGB MFR BLDA: 1.4 % — SIGNIFICANT CHANGE UP
COHGB MFR BLDA: 1.5 % — SIGNIFICANT CHANGE UP
COHGB MFR BLDV: 1.5 % — SIGNIFICANT CHANGE UP
CREAT SERPL-MCNC: 0.9 MG/DL — SIGNIFICANT CHANGE UP (ref 0.5–1.3)
CREAT SERPL-MCNC: 0.94 MG/DL — SIGNIFICANT CHANGE UP (ref 0.5–1.3)
CREAT SERPL-MCNC: 1.01 MG/DL — SIGNIFICANT CHANGE UP (ref 0.5–1.3)
CREAT SERPL-MCNC: 1.1 MG/DL — SIGNIFICANT CHANGE UP (ref 0.5–1.3)
EGFR: 103 ML/MIN/1.73M2 — SIGNIFICANT CHANGE UP
EGFR: 108 ML/MIN/1.73M2 — SIGNIFICANT CHANGE UP
EGFR: 85 ML/MIN/1.73M2 — SIGNIFICANT CHANGE UP
EGFR: 94 ML/MIN/1.73M2 — SIGNIFICANT CHANGE UP
GAS PNL BLDA: SIGNIFICANT CHANGE UP
GAS PNL BLDV: 137 MMOL/L — SIGNIFICANT CHANGE UP (ref 136–145)
GLUCOSE BLDA-MCNC: 108 MG/DL — HIGH (ref 70–99)
GLUCOSE BLDA-MCNC: 131 MG/DL — HIGH (ref 70–99)
GLUCOSE BLDA-MCNC: 132 MG/DL — HIGH (ref 70–99)
GLUCOSE BLDA-MCNC: 97 MG/DL — SIGNIFICANT CHANGE UP (ref 70–99)
GLUCOSE BLDC GLUCOMTR-MCNC: 100 MG/DL — HIGH (ref 70–99)
GLUCOSE BLDV-MCNC: 135 MG/DL — HIGH (ref 70–99)
GLUCOSE SERPL-MCNC: 104 MG/DL — HIGH (ref 70–99)
GLUCOSE SERPL-MCNC: 114 MG/DL — HIGH (ref 70–99)
GLUCOSE SERPL-MCNC: 115 MG/DL — HIGH (ref 70–99)
GLUCOSE SERPL-MCNC: 118 MG/DL — HIGH (ref 70–99)
HCO3 BLDA-SCNC: 25 MMOL/L — SIGNIFICANT CHANGE UP (ref 21–28)
HCO3 BLDA-SCNC: 28 MMOL/L — SIGNIFICANT CHANGE UP (ref 21–28)
HCO3 BLDV-SCNC: 24 MMOL/L — SIGNIFICANT CHANGE UP (ref 22–29)
HCO3 BLDV-SCNC: 29 MMOL/L — SIGNIFICANT CHANGE UP (ref 22–29)
HCT VFR BLD CALC: 30.2 % — LOW (ref 39–50)
HCT VFR BLD CALC: 30.8 % — LOW (ref 39–50)
HCT VFR BLD CALC: 32.5 % — LOW (ref 39–50)
HCT VFR BLD CALC: 41.5 % — SIGNIFICANT CHANGE UP (ref 39–50)
HGB BLD CALC-MCNC: 10.6 G/DL — LOW (ref 12.6–17.4)
HGB BLD-MCNC: 10.1 G/DL — LOW (ref 13–17)
HGB BLD-MCNC: 12.9 G/DL — LOW (ref 13–17)
HGB BLD-MCNC: 9.5 G/DL — LOW (ref 13–17)
HGB BLD-MCNC: 9.6 G/DL — LOW (ref 13–17)
HGB BLDA-MCNC: 10.1 G/DL — LOW (ref 12.6–17.4)
HGB BLDA-MCNC: 10.6 G/DL — LOW (ref 12.6–17.4)
HGB BLDA-MCNC: 11.1 G/DL — LOW (ref 12.6–17.4)
HGB BLDA-MCNC: 11.4 G/DL — LOW (ref 12.6–17.4)
INR BLD: 0.89 — SIGNIFICANT CHANGE UP (ref 0.85–1.18)
INR BLD: 0.9 — SIGNIFICANT CHANGE UP (ref 0.85–1.18)
INR BLD: 0.92 — SIGNIFICANT CHANGE UP (ref 0.85–1.18)
INR BLD: 0.99 — SIGNIFICANT CHANGE UP (ref 0.85–1.18)
MAGNESIUM SERPL-MCNC: 2 MG/DL — SIGNIFICANT CHANGE UP (ref 1.6–2.6)
MAGNESIUM SERPL-MCNC: 2.1 MG/DL — SIGNIFICANT CHANGE UP (ref 1.6–2.6)
MAGNESIUM SERPL-MCNC: 2.1 MG/DL — SIGNIFICANT CHANGE UP (ref 1.6–2.6)
MAGNESIUM SERPL-MCNC: 2.8 MG/DL — HIGH (ref 1.6–2.6)
MCHC RBC-ENTMCNC: 21.8 PG — LOW (ref 27–34)
MCHC RBC-ENTMCNC: 22.1 PG — LOW (ref 27–34)
MCHC RBC-ENTMCNC: 22.1 PG — LOW (ref 27–34)
MCHC RBC-ENTMCNC: 22.2 PG — LOW (ref 27–34)
MCHC RBC-ENTMCNC: 31.1 GM/DL — LOW (ref 32–36)
MCHC RBC-ENTMCNC: 31.1 GM/DL — LOW (ref 32–36)
MCHC RBC-ENTMCNC: 31.2 GM/DL — LOW (ref 32–36)
MCHC RBC-ENTMCNC: 31.5 GM/DL — LOW (ref 32–36)
MCV RBC AUTO: 70 FL — LOW (ref 80–100)
MCV RBC AUTO: 70.2 FL — LOW (ref 80–100)
MCV RBC AUTO: 71.1 FL — LOW (ref 80–100)
MCV RBC AUTO: 71.4 FL — LOW (ref 80–100)
METHGB MFR BLDA: 0.6 % — SIGNIFICANT CHANGE UP
METHGB MFR BLDA: 0.7 % — SIGNIFICANT CHANGE UP
METHGB MFR BLDA: 0.9 % — SIGNIFICANT CHANGE UP
METHGB MFR BLDA: 1 % — SIGNIFICANT CHANGE UP
METHGB MFR BLDV: 0.5 % — SIGNIFICANT CHANGE UP
NRBC # BLD: 0 /100 WBCS — SIGNIFICANT CHANGE UP (ref 0–0)
OXYHGB MFR BLDA: 97.5 % — HIGH (ref 90–95)
OXYHGB MFR BLDA: 97.5 % — HIGH (ref 90–95)
OXYHGB MFR BLDA: 97.8 % — HIGH (ref 90–95)
OXYHGB MFR BLDA: 98.2 % — HIGH (ref 90–95)
PCO2 BLDA: 35 MMHG — SIGNIFICANT CHANGE UP (ref 35–48)
PCO2 BLDA: 40 MMHG — SIGNIFICANT CHANGE UP (ref 35–48)
PCO2 BLDA: 42 MMHG — SIGNIFICANT CHANGE UP (ref 35–48)
PCO2 BLDA: 50 MMHG — HIGH (ref 35–48)
PCO2 BLDV: 51 MMHG — SIGNIFICANT CHANGE UP (ref 42–55)
PCO2 BLDV: 61 MMHG — HIGH (ref 42–55)
PH BLDA: 7.36 — SIGNIFICANT CHANGE UP (ref 7.35–7.45)
PH BLDA: 7.41 — SIGNIFICANT CHANGE UP (ref 7.35–7.45)
PH BLDA: 7.43 — SIGNIFICANT CHANGE UP (ref 7.35–7.45)
PH BLDA: 7.51 — HIGH (ref 7.35–7.45)
PH BLDV: 7.28 — LOW (ref 7.32–7.43)
PH BLDV: 7.29 — LOW (ref 7.32–7.43)
PHOSPHATE SERPL-MCNC: 3.1 MG/DL — SIGNIFICANT CHANGE UP (ref 2.5–4.5)
PHOSPHATE SERPL-MCNC: 3.8 MG/DL — SIGNIFICANT CHANGE UP (ref 2.5–4.5)
PHOSPHATE SERPL-MCNC: 3.9 MG/DL — SIGNIFICANT CHANGE UP (ref 2.5–4.5)
PLATELET # BLD AUTO: 214 K/UL — SIGNIFICANT CHANGE UP (ref 150–400)
PLATELET # BLD AUTO: 243 K/UL — SIGNIFICANT CHANGE UP (ref 150–400)
PLATELET # BLD AUTO: 243 K/UL — SIGNIFICANT CHANGE UP (ref 150–400)
PLATELET # BLD AUTO: 283 K/UL — SIGNIFICANT CHANGE UP (ref 150–400)
PO2 BLDA: 435 MMHG — HIGH (ref 83–108)
PO2 BLDA: 435 MMHG — HIGH (ref 83–108)
PO2 BLDA: 504 MMHG — HIGH (ref 83–108)
PO2 BLDA: 512 MMHG — HIGH (ref 83–108)
PO2 BLDV: 52 MMHG — HIGH (ref 25–45)
PO2 BLDV: 52 MMHG — HIGH (ref 25–45)
POTASSIUM BLDA-SCNC: 3.8 MMOL/L — SIGNIFICANT CHANGE UP (ref 3.5–5.1)
POTASSIUM BLDA-SCNC: 4 MMOL/L — SIGNIFICANT CHANGE UP (ref 3.5–5.1)
POTASSIUM BLDA-SCNC: 4.8 MMOL/L — SIGNIFICANT CHANGE UP (ref 3.5–5.1)
POTASSIUM BLDA-SCNC: 5.5 MMOL/L — HIGH (ref 3.5–5.1)
POTASSIUM BLDV-SCNC: 5.3 MMOL/L — HIGH (ref 3.5–5.1)
POTASSIUM SERPL-MCNC: 4 MMOL/L — SIGNIFICANT CHANGE UP (ref 3.5–5.3)
POTASSIUM SERPL-MCNC: 4.1 MMOL/L — SIGNIFICANT CHANGE UP (ref 3.5–5.3)
POTASSIUM SERPL-MCNC: 4.1 MMOL/L — SIGNIFICANT CHANGE UP (ref 3.5–5.3)
POTASSIUM SERPL-MCNC: 4.4 MMOL/L — SIGNIFICANT CHANGE UP (ref 3.5–5.3)
POTASSIUM SERPL-SCNC: 4 MMOL/L — SIGNIFICANT CHANGE UP (ref 3.5–5.3)
POTASSIUM SERPL-SCNC: 4.1 MMOL/L — SIGNIFICANT CHANGE UP (ref 3.5–5.3)
POTASSIUM SERPL-SCNC: 4.1 MMOL/L — SIGNIFICANT CHANGE UP (ref 3.5–5.3)
POTASSIUM SERPL-SCNC: 4.4 MMOL/L — SIGNIFICANT CHANGE UP (ref 3.5–5.3)
PROT SERPL-MCNC: 5.3 G/DL — LOW (ref 6–8.3)
PROT SERPL-MCNC: 5.9 G/DL — LOW (ref 6–8.3)
PROT SERPL-MCNC: 6 G/DL — SIGNIFICANT CHANGE UP (ref 6–8.3)
PROTHROM AB SERPL-ACNC: 10.2 SEC — SIGNIFICANT CHANGE UP (ref 9.5–13)
PROTHROM AB SERPL-ACNC: 10.3 SEC — SIGNIFICANT CHANGE UP (ref 9.5–13)
PROTHROM AB SERPL-ACNC: 10.5 SEC — SIGNIFICANT CHANGE UP (ref 9.5–13)
PROTHROM AB SERPL-ACNC: 11.3 SEC — SIGNIFICANT CHANGE UP (ref 9.5–13)
RBC # BLD: 4.3 M/UL — SIGNIFICANT CHANGE UP (ref 4.2–5.8)
RBC # BLD: 4.4 M/UL — SIGNIFICANT CHANGE UP (ref 4.2–5.8)
RBC # BLD: 4.57 M/UL — SIGNIFICANT CHANGE UP (ref 4.2–5.8)
RBC # BLD: 5.81 M/UL — HIGH (ref 4.2–5.8)
RBC # FLD: 14 % — SIGNIFICANT CHANGE UP (ref 10.3–14.5)
RBC # FLD: 14.3 % — SIGNIFICANT CHANGE UP (ref 10.3–14.5)
RBC # FLD: 14.3 % — SIGNIFICANT CHANGE UP (ref 10.3–14.5)
RBC # FLD: 14.4 % — SIGNIFICANT CHANGE UP (ref 10.3–14.5)
RH IG SCN BLD-IMP: POSITIVE — SIGNIFICANT CHANGE UP
SAO2 % BLDA: 100 % — HIGH (ref 94–98)
SAO2 % BLDA: 100 % — HIGH (ref 94–98)
SAO2 % BLDA: 99.6 % — HIGH (ref 94–98)
SAO2 % BLDA: 99.9 % — HIGH (ref 94–98)
SAO2 % BLDV: 81 % — SIGNIFICANT CHANGE UP (ref 67–88)
SAO2 % BLDV: 82.5 % — SIGNIFICANT CHANGE UP (ref 67–88)
SODIUM BLDA-SCNC: 136 MMOL/L — SIGNIFICANT CHANGE UP (ref 136–145)
SODIUM BLDA-SCNC: 137 MMOL/L — SIGNIFICANT CHANGE UP (ref 136–145)
SODIUM SERPL-SCNC: 136 MMOL/L — SIGNIFICANT CHANGE UP (ref 135–145)
SODIUM SERPL-SCNC: 138 MMOL/L — SIGNIFICANT CHANGE UP (ref 135–145)
SODIUM SERPL-SCNC: 139 MMOL/L — SIGNIFICANT CHANGE UP (ref 135–145)
SODIUM SERPL-SCNC: 139 MMOL/L — SIGNIFICANT CHANGE UP (ref 135–145)
WBC # BLD: 12.23 K/UL — HIGH (ref 3.8–10.5)
WBC # BLD: 13.34 K/UL — HIGH (ref 3.8–10.5)
WBC # BLD: 14.12 K/UL — HIGH (ref 3.8–10.5)
WBC # BLD: 7.1 K/UL — SIGNIFICANT CHANGE UP (ref 3.8–10.5)
WBC # FLD AUTO: 12.23 K/UL — HIGH (ref 3.8–10.5)
WBC # FLD AUTO: 13.34 K/UL — HIGH (ref 3.8–10.5)
WBC # FLD AUTO: 14.12 K/UL — HIGH (ref 3.8–10.5)
WBC # FLD AUTO: 7.1 K/UL — SIGNIFICANT CHANGE UP (ref 3.8–10.5)

## 2024-02-27 PROCEDURE — 33507 REPAIR ART INTRAMURAL: CPT

## 2024-02-27 PROCEDURE — 99292 CRITICAL CARE ADDL 30 MIN: CPT

## 2024-02-27 PROCEDURE — 99291 CRITICAL CARE FIRST HOUR: CPT

## 2024-02-27 PROCEDURE — 33507 REPAIR ART INTRAMURAL: CPT | Mod: AS

## 2024-02-27 PROCEDURE — 71045 X-RAY EXAM CHEST 1 VIEW: CPT | Mod: 26

## 2024-02-27 RX ORDER — ACETAMINOPHEN 500 MG
1000 TABLET ORAL EVERY 6 HOURS
Refills: 0 | Status: COMPLETED | OUTPATIENT
Start: 2024-02-27 | End: 2024-02-28

## 2024-02-27 RX ORDER — KETOROLAC TROMETHAMINE 30 MG/ML
30 SYRINGE (ML) INJECTION ONCE
Refills: 0 | Status: DISCONTINUED | OUTPATIENT
Start: 2024-02-27 | End: 2024-02-27

## 2024-02-27 RX ORDER — ASPIRIN/CALCIUM CARB/MAGNESIUM 324 MG
81 TABLET ORAL DAILY
Refills: 0 | Status: DISCONTINUED | OUTPATIENT
Start: 2024-02-27 | End: 2024-03-02

## 2024-02-27 RX ORDER — SENNA PLUS 8.6 MG/1
2 TABLET ORAL AT BEDTIME
Refills: 0 | Status: DISCONTINUED | OUTPATIENT
Start: 2024-02-28 | End: 2024-03-02

## 2024-02-27 RX ORDER — MUPIROCIN 20 MG/G
1 OINTMENT TOPICAL
Refills: 0 | Status: DISCONTINUED | OUTPATIENT
Start: 2024-02-27 | End: 2024-03-02

## 2024-02-27 RX ORDER — DEXTROSE 50 % IN WATER 50 %
50 SYRINGE (ML) INTRAVENOUS
Refills: 0 | Status: DISCONTINUED | OUTPATIENT
Start: 2024-02-27 | End: 2024-02-28

## 2024-02-27 RX ORDER — CHLORHEXIDINE GLUCONATE 213 G/1000ML
5 SOLUTION TOPICAL
Refills: 0 | Status: DISCONTINUED | OUTPATIENT
Start: 2024-02-27 | End: 2024-02-27

## 2024-02-27 RX ORDER — DEXTROSE 50 % IN WATER 50 %
25 SYRINGE (ML) INTRAVENOUS
Refills: 0 | Status: DISCONTINUED | OUTPATIENT
Start: 2024-02-27 | End: 2024-02-28

## 2024-02-27 RX ORDER — OXYCODONE AND ACETAMINOPHEN 5; 325 MG/1; MG/1
1 TABLET ORAL EVERY 6 HOURS
Refills: 0 | Status: DISCONTINUED | OUTPATIENT
Start: 2024-02-28 | End: 2024-02-28

## 2024-02-27 RX ORDER — INSULIN HUMAN 100 [IU]/ML
1 INJECTION, SOLUTION SUBCUTANEOUS
Qty: 50 | Refills: 0 | Status: DISCONTINUED | OUTPATIENT
Start: 2024-02-27 | End: 2024-02-28

## 2024-02-27 RX ORDER — POLYETHYLENE GLYCOL 3350 17 G/17G
17 POWDER, FOR SOLUTION ORAL DAILY
Refills: 0 | Status: DISCONTINUED | OUTPATIENT
Start: 2024-02-28 | End: 2024-03-02

## 2024-02-27 RX ORDER — SODIUM CHLORIDE 9 MG/ML
1000 INJECTION INTRAMUSCULAR; INTRAVENOUS; SUBCUTANEOUS
Refills: 0 | Status: DISCONTINUED | OUTPATIENT
Start: 2024-02-27 | End: 2024-02-28

## 2024-02-27 RX ORDER — HEPARIN SODIUM 5000 [USP'U]/ML
5000 INJECTION INTRAVENOUS; SUBCUTANEOUS EVERY 8 HOURS
Refills: 0 | Status: DISCONTINUED | OUTPATIENT
Start: 2024-02-27 | End: 2024-03-02

## 2024-02-27 RX ORDER — DEXMEDETOMIDINE HYDROCHLORIDE IN 0.9% SODIUM CHLORIDE 4 UG/ML
0.2 INJECTION INTRAVENOUS
Qty: 400 | Refills: 0 | Status: DISCONTINUED | OUTPATIENT
Start: 2024-02-27 | End: 2024-02-27

## 2024-02-27 RX ORDER — PROPOFOL 10 MG/ML
10 INJECTION, EMULSION INTRAVENOUS
Qty: 1000 | Refills: 0 | Status: DISCONTINUED | OUTPATIENT
Start: 2024-02-27 | End: 2024-02-27

## 2024-02-27 RX ORDER — CHLORHEXIDINE GLUCONATE 213 G/1000ML
15 SOLUTION TOPICAL EVERY 12 HOURS
Refills: 0 | Status: DISCONTINUED | OUTPATIENT
Start: 2024-02-27 | End: 2024-02-28

## 2024-02-27 RX ORDER — ASCORBIC ACID 60 MG
500 TABLET,CHEWABLE ORAL
Refills: 0 | Status: DISCONTINUED | OUTPATIENT
Start: 2024-02-27 | End: 2024-03-02

## 2024-02-27 RX ORDER — CHLORHEXIDINE GLUCONATE 213 G/1000ML
1 SOLUTION TOPICAL DAILY
Refills: 0 | Status: DISCONTINUED | OUTPATIENT
Start: 2024-02-27 | End: 2024-03-02

## 2024-02-27 RX ORDER — KETOROLAC TROMETHAMINE 30 MG/ML
30 SYRINGE (ML) INJECTION EVERY 6 HOURS
Refills: 0 | Status: DISCONTINUED | OUTPATIENT
Start: 2024-02-27 | End: 2024-03-02

## 2024-02-27 RX ORDER — CEFAZOLIN SODIUM 1 G
2000 VIAL (EA) INJECTION EVERY 8 HOURS
Refills: 0 | Status: COMPLETED | OUTPATIENT
Start: 2024-02-27 | End: 2024-02-29

## 2024-02-27 RX ORDER — ATORVASTATIN CALCIUM 80 MG/1
40 TABLET, FILM COATED ORAL AT BEDTIME
Refills: 0 | Status: DISCONTINUED | OUTPATIENT
Start: 2024-02-27 | End: 2024-03-02

## 2024-02-27 RX ORDER — PANTOPRAZOLE SODIUM 20 MG/1
40 TABLET, DELAYED RELEASE ORAL DAILY
Refills: 0 | Status: DISCONTINUED | OUTPATIENT
Start: 2024-02-27 | End: 2024-02-28

## 2024-02-27 RX ORDER — FENTANYL CITRATE 50 UG/ML
25 INJECTION INTRAVENOUS
Refills: 0 | Status: DISCONTINUED | OUTPATIENT
Start: 2024-02-27 | End: 2024-02-28

## 2024-02-27 RX ADMIN — Medication 100 MILLIGRAM(S): at 18:19

## 2024-02-27 RX ADMIN — PANTOPRAZOLE SODIUM 40 MILLIGRAM(S): 20 TABLET, DELAYED RELEASE ORAL at 18:20

## 2024-02-27 RX ADMIN — CHLORHEXIDINE GLUCONATE 1 APPLICATION(S): 213 SOLUTION TOPICAL at 06:01

## 2024-02-27 RX ADMIN — ATORVASTATIN CALCIUM 40 MILLIGRAM(S): 80 TABLET, FILM COATED ORAL at 21:05

## 2024-02-27 RX ADMIN — HEPARIN SODIUM 5000 UNIT(S): 5000 INJECTION INTRAVENOUS; SUBCUTANEOUS at 05:46

## 2024-02-27 RX ADMIN — Medication 30 MILLIGRAM(S): at 17:20

## 2024-02-27 RX ADMIN — PANTOPRAZOLE SODIUM 40 MILLIGRAM(S): 20 TABLET, DELAYED RELEASE ORAL at 06:04

## 2024-02-27 RX ADMIN — FENTANYL CITRATE 25 MICROGRAM(S): 50 INJECTION INTRAVENOUS at 20:58

## 2024-02-27 RX ADMIN — Medication 1000 MILLIGRAM(S): at 18:19

## 2024-02-27 RX ADMIN — Medication 400 MILLIGRAM(S): at 23:56

## 2024-02-27 RX ADMIN — SODIUM CHLORIDE 3 MILLILITER(S): 9 INJECTION INTRAMUSCULAR; INTRAVENOUS; SUBCUTANEOUS at 05:59

## 2024-02-27 RX ADMIN — Medication 30 MILLIGRAM(S): at 17:05

## 2024-02-27 RX ADMIN — CHLORHEXIDINE GLUCONATE 15 MILLILITER(S): 213 SOLUTION TOPICAL at 18:20

## 2024-02-27 RX ADMIN — CHLORHEXIDINE GLUCONATE 10 MILLILITER(S): 213 SOLUTION TOPICAL at 05:46

## 2024-02-27 RX ADMIN — Medication 400 MILLIGRAM(S): at 18:19

## 2024-02-27 RX ADMIN — MUPIROCIN 1 APPLICATION(S): 20 OINTMENT TOPICAL at 19:36

## 2024-02-27 RX ADMIN — Medication 30 MILLIGRAM(S): at 22:35

## 2024-02-27 RX ADMIN — Medication 30 MILLIGRAM(S): at 22:21

## 2024-02-27 RX ADMIN — FENTANYL CITRATE 25 MICROGRAM(S): 50 INJECTION INTRAVENOUS at 21:12

## 2024-02-27 NOTE — BRIEF OPERATIVE NOTE - COMMENTS
Your pt I was the first assistant for this case including but not limited to sternotomy and right coronary transposition

## 2024-02-27 NOTE — PROGRESS NOTE ADULT - SUBJECTIVE AND OBJECTIVE BOX
INTERVAL HPI/OVERNIGHT EVENTS:    OpDay: transposition of RCA\  EF normal    Card: Dr. Virgil Morrissey     43 yo male current marijuana user Hx NSVT, anemia, HLD, anomalous RCA presenting ihwt chest pressure and palpitations/VANG during trip in [facility] after use of illicit drugs    sxs resolved after 1 hour    Exercise Stress Echo 3/15: EF 65%, AV tricuspid, 1.5mm ST depression in leads II, III and avF, V4 and V5 during peak stress period   CCTA 12/5:  calcium score 0, RCA has an anomalous take off from the left coronary cusp, no evidence of coronary atherosclerosis   ECHO 3/15: EF 66% mild MR, mildly dilated LA.     Cath (2/26/24): LM normal, LAD mild LI, LCx mild LI, RCA anomalous takefoff rom L coronary cusp w/ no coronary disease.     to OR today - no intraop blood products given; 2.5 L Crystalloid   arrived to ICU intubated; on propofol and precedex; swan in place - CO 7 per anesthesia     ICU Vital Signs Last 24 Hrs  T(C): 36.8 (27 Feb 2024 07:30), Max: 37 (26 Feb 2024 21:03)  T(F): 98.3 (27 Feb 2024 07:30), Max: 98.6 (26 Feb 2024 21:03)  HR: 78 (27 Feb 2024 13:30) (57 - 80) sinus   BP: 121/76 (27 Feb 2024 07:30) (114/73 - 128/78)  BP(mean): 86 (27 Feb 2024 05:23) (86 - 100)  ABP: 128/52 (27 Feb 2024 13:30) (128/52 - 128/52)  ABP(mean): 76 (27 Feb 2024 13:30) (76 - 76)  RR: 12 (27 Feb 2024 13:30) (12 - 18)  SpO2: 100% (27 Feb 2024 13:30) (99% - 100%) Fi02 50%    Qtts:   Propofol 50  Precedex    I&O's Summary    26 Feb 2024 07:01  -  27 Feb 2024 07:00  --------------------------------------------------------  IN: 1035 mL / OUT: 1000 mL / NET: 35 mL    27 Feb 2024 07:01  -  27 Feb 2024 13:38  --------------------------------------------------------  IN: 0 mL / OUT: 150 mL / NET: -150 mL    Mode: AC/ CMV (Assist Control/ Continuous Mandatory Ventilation)  RR (machine): 12  TV (machine): 550  FiO2: 50  PEEP: 5  ITime: 1  MAP: 7  PIP: 16      Physical Exam    Heart - regular - no rub/gallop  Lungs - BS appreciated bilaterally - no rhonchi/wheeze  Abd -((+) BS but decreased; soft NTND - no r/r/g  Ext -warm to touch; palpable peripheral pulses distally; no c/c/e  Chest - op bandage in place   Neuro - pupils reactive to light; otherwise unable at this time   Skin - no rash     LABS: post-op labs pending                         12.9   7.10  )-----------( 283      ( 27 Feb 2024 06:27 )             41.5     02-27    136  |  101  |  14  ----------------------------<  104<H>  4.0   |  29  |  1.10    Ca    10.0      27 Feb 2024 06:27  Mg     2.0     02-27    TPro  7.3  /  Alb  4.4  /  TBili  0.5  /  DBili  x   /  AST  17  /  ALT  23  /  AlkPhos  62  02-26    PT/INR - ( 27 Feb 2024 06:27 )   PT: 10.3 sec;   INR: 0.90     PTT - ( 26 Feb 2024 07:48 )  PTT:32.8 sec    ABG - ( 27 Feb 2024 12:08 )  pH, Arterial: 7.41  pH, Blood: x     /  pCO2: 40    /  pO2: 435   / HCO3: 25    / Base Excess: 0.7   /  SaO2: 100.0     RADIOLOGY & ADDITIONAL STUDIES: reviewed     Patient with anomalous RCA - No CAD now s/p operative repair    1. CV  hemodynamically stable  sinus rhythm   pacer on standby  ASA  complete periop Abx prophylaxis     2. Pulm   serial ABG to optimize oxygenation and ventilation; titrate vent settings  anticipate wean and liberate from vent in short post-op period   monitor chest tube output    maintain glycemic control   pain management    DVT and GI prophylaxis    d/w anesthesia/staff and CTS    I have spent/provided stated minutes of critical care time to this patient: 60

## 2024-02-27 NOTE — PROGRESS NOTE ADULT - SUBJECTIVE AND OBJECTIVE BOX
CTICU  CRITICAL  CARE  attending     Hand off received 					   Pertinent clinical, laboratory, radiographic, hemodynamic, echocardiographic, respiratory data, microbiologic data and chart were reviewed and analyzed frequently throughout the course of the day  Patient seen and examined with CTS/ SH attending at bedside  Pt is a 44yr old male with PMH NSVT, HLD, anomalous RCA, admitted for surgical mgmt. Pt underwent transposition of anomalous RCA (EF 65%) with Dr. Verde. Given 2500cc IVF, 850cc urine output. Arrived intubated on propofol and precedex. Extubated short course.     FAMILY HISTORY:  No pertinent family history in first degree relatives  PAST MEDICAL & SURGICAL HISTORY:  Anomalous right coronary artery  History of anemia  HLD (hyperlipidemia)  No significant past surgical history        Patient is a 44y old  Male who presents with a chief complaint of Pre-Op LHC.      14 system review was unremarkable    Vital signs, hemodynamic and respiratory parameters were reviewed from the bedside nursing flowsheet.  ICU Vital Signs Last 24 Hrs  T(C): 36.6 (27 Feb 2024 17:15), Max: 37 (26 Feb 2024 21:03)  T(F): 97.9 (27 Feb 2024 17:15), Max: 98.6 (26 Feb 2024 21:03)  HR: 81 (27 Feb 2024 19:00) (66 - 87)  BP: 121/76 (27 Feb 2024 07:30) (114/73 - 128/78)  BP(mean): 86 (27 Feb 2024 05:23) (86 - 95)  ABP: 116/56 (27 Feb 2024 19:00) (104/51 - 140/67)  ABP(mean): 72 (27 Feb 2024 19:00) (65 - 86)  RR: 12 (27 Feb 2024 19:00) (12 - 20)  SpO2: 100% (27 Feb 2024 19:00) (99% - 100%)    O2 Parameters below as of 27 Feb 2024 20:00  Patient On (Oxygen Delivery Method): nasal cannula w/ humidification  O2 Flow (L/min): 6        Adult Advanced Hemodynamics Last 24 Hrs  CVP(mm Hg): 2 (27 Feb 2024 19:00) (2 - 10)  CVP(cm H2O): --  CO: 4.4 (27 Feb 2024 16:00) (4.4 - 4.9)  CI: 2.1 (27 Feb 2024 16:00) (2.1 - 2.4)  PA: 16/4 (27 Feb 2024 19:00) (16/0 - 22/4)  PA(mean): 10 (27 Feb 2024 19:00) (8 - 13)  PCWP: --  SVR: 1035 (27 Feb 2024 16:00) (962 - 1071)  SVRI: 2168 (27 Feb 2024 16:00) (1964 - 2188)  PVR: --  PVRI: --, ABG - ( 27 Feb 2024 19:08 )  pH, Arterial: 7.29  pH, Blood: x     /  pCO2: 53    /  pO2: 128   / HCO3: 26    / Base Excess: -1.9  /  SaO2: 99.7              Mode: CPAP with PS  FiO2: 50  PEEP: 5  PS: 12  MAP: 9  PIP: 17    Intake and output was reviewed and the fluid balance was calculated  Daily     Daily   I&O's Summary    26 Feb 2024 07:01  -  27 Feb 2024 07:00  --------------------------------------------------------  IN: 1035 mL / OUT: 1000 mL / NET: 35 mL    27 Feb 2024 07:01  -  27 Feb 2024 19:23  --------------------------------------------------------  IN: 235.8 mL / OUT: 1180 mL / NET: -944.2 mL        All lines and drain sites were assessed  Glycemic trend was reviewedCAPILLARY BLOOD GLUCOSE      POCT Blood Glucose.: 100 mg/dL (27 Feb 2024 06:14)      Neuro: nad  HEENT: mmm  Heart: s1 s2  Lungs: clear bl  Abdomen: soft nt nd  Extremities: wwp    Lines:  RIJ Cordis with [place] 2/27  R radial arterial line 2/27    Tubes:  Pleural  Med x 2    labs  CBC Full  -  ( 27 Feb 2024 19:09 )  WBC Count : 14.12 K/uL  RBC Count : 4.57 M/uL  Hemoglobin : 10.1 g/dL  Hematocrit : 32.5 %  Platelet Count - Automated : 243 K/uL  Mean Cell Volume : 71.1 fl  Mean Cell Hemoglobin : 22.1 pg  Mean Cell Hemoglobin Concentration : 31.1 gm/dL  Auto Neutrophil # : x  Auto Lymphocyte # : x  Auto Monocyte # : x  Auto Eosinophil # : x  Auto Basophil # : x  Auto Neutrophil % : x  Auto Lymphocyte % : x  Auto Monocyte % : x  Auto Eosinophil % : x  Auto Basophil % : x    02-27    139  |  107  |  11  ----------------------------<  115<H>  4.1   |  25  |  1.01    Ca    9.4      27 Feb 2024 13:29  Phos  3.1     02-27  Mg     2.8     02-27    TPro  5.3<L>  /  Alb  3.2<L>  /  TBili  0.9  /  DBili  x   /  AST  22  /  ALT  16  /  AlkPhos  40  02-27    PT/INR - ( 27 Feb 2024 19:09 )   PT: 10.5 sec;   INR: 0.92          PTT - ( 27 Feb 2024 19:09 )  PTT:33.2 sec  The current medications were reviewed   MEDICATIONS  (STANDING):  acetaminophen   IVPB .. 1000 milliGRAM(s) IV Intermittent every 6 hours  ascorbic acid 500 milliGRAM(s) Oral two times a day  aspirin enteric coated 81 milliGRAM(s) Oral daily  atorvastatin 40 milliGRAM(s) Oral at bedtime  ceFAZolin   IVPB 2000 milliGRAM(s) IV Intermittent every 8 hours  chlorhexidine 0.12% Liquid 15 milliLiter(s) Oral Mucosa every 12 hours  chlorhexidine 2% Cloths 1 Application(s) Topical daily  dexMEDEtomidine Infusion 0.2 MICROgram(s)/kG/Hr (3.97 mL/Hr) IV Continuous <Continuous>  dextrose 50% Injectable 25 milliLiter(s) IV Push every 15 minutes  dextrose 50% Injectable 50 milliLiter(s) IV Push every 15 minutes  heparin   Injectable 5000 Unit(s) SubCutaneous every 8 hours  insulin regular Infusion 1 Unit(s)/Hr (1 mL/Hr) IV Continuous <Continuous>  mupirocin 2% Ointment 1 Application(s) Both Nostrils two times a day  pantoprazole  Injectable 40 milliGRAM(s) IV Push daily  propofol Infusion 10 MICROgram(s)/kG/Min (4.76 mL/Hr) IV Continuous <Continuous>  sodium chloride 0.9%. 1000 milliLiter(s) (75 mL/Hr) IV Continuous <Continuous>  sodium chloride 0.9%. 1000 milliLiter(s) (225 mL/Hr) IV Continuous <Continuous>  sodium chloride 0.9%. 1000 milliLiter(s) (10 mL/Hr) IV Continuous <Continuous>    MEDICATIONS  (PRN):  fentaNYL    Injectable 25 MICROGram(s) IV Push every 3 hours PRN Severe Pain (7 - 10)      Assessment/Plan:  44yr old male with PMH NSVT, HLD, anomalous RCA, admitted for surgical mgmt.     Sp transposition of anomalous RCA (EF 65%, [facility], 2/27)  Acute post operative anemia due to blood loss-trend H/H  AM EKG  Mild hypercapnea-will place on bipap  Serial ABG  Periop abx  Aspirin  Statin  NPO while on bipap  Replete lytes prn  Monitor CT output  GI/DVT PPX  Bowel Regimen  Pain control  OOB with PT  Close hemodynamic, ventilatory and drain monitoring and management per post op routine  Monitor for arrhythmias and monitor parameters for organ perfusion  Monitor neurologic status  Head of the bed should remain elevated to 45 deg   Chest PT and IS will be encouraged  Monitor adequacy of oxygenation and ventilation and attempt to wean oxygen  Antibiotic regimen will be tailored to the clinical, laboratory and microbiologic data  Nutritional goals will be met using po eventually, ensure adequate caloric intake and monitor the same  Stress ulcer and VTE prophylaxis will be achieved    Glycemic control is satisfactory  Electrolytes have been repleted as necessary and wound care has been carried out   Pain control has been achieved.   Aggressive physical therapy and early mobility and ambulation goals will be met   The family was updated about the course and plan.    CRITICAL CARE TIME personally provided by me  in evaluation and management, reassessments, review and interpretation of labs and x-rays, ventilator and hemodynamic management, formulating a plan and coordinating care: ___60__ MIN.  Time does not include procedural time.          CTICU ATTENDING     					  Mira Hernandez MD

## 2024-02-28 LAB
ALBUMIN SERPL ELPH-MCNC: 3.5 G/DL — SIGNIFICANT CHANGE UP (ref 3.3–5)
ALBUMIN SERPL ELPH-MCNC: 3.6 G/DL — SIGNIFICANT CHANGE UP (ref 3.3–5)
ALP SERPL-CCNC: 36 U/L — LOW (ref 40–120)
ALP SERPL-CCNC: 39 U/L — LOW (ref 40–120)
ALT FLD-CCNC: 14 U/L — SIGNIFICANT CHANGE UP (ref 10–45)
ALT FLD-CCNC: 15 U/L — SIGNIFICANT CHANGE UP (ref 10–45)
ANION GAP SERPL CALC-SCNC: 6 MMOL/L — SIGNIFICANT CHANGE UP (ref 5–17)
ANION GAP SERPL CALC-SCNC: 8 MMOL/L — SIGNIFICANT CHANGE UP (ref 5–17)
APTT BLD: 31.4 SEC — SIGNIFICANT CHANGE UP (ref 24.5–35.6)
AST SERPL-CCNC: 25 U/L — SIGNIFICANT CHANGE UP (ref 10–40)
AST SERPL-CCNC: 26 U/L — SIGNIFICANT CHANGE UP (ref 10–40)
BILIRUB SERPL-MCNC: 0.5 MG/DL — SIGNIFICANT CHANGE UP (ref 0.2–1.2)
BILIRUB SERPL-MCNC: 0.6 MG/DL — SIGNIFICANT CHANGE UP (ref 0.2–1.2)
BUN SERPL-MCNC: 7 MG/DL — SIGNIFICANT CHANGE UP (ref 7–23)
BUN SERPL-MCNC: 7 MG/DL — SIGNIFICANT CHANGE UP (ref 7–23)
CALCIUM SERPL-MCNC: 8.4 MG/DL — SIGNIFICANT CHANGE UP (ref 8.4–10.5)
CALCIUM SERPL-MCNC: 9 MG/DL — SIGNIFICANT CHANGE UP (ref 8.4–10.5)
CHLORIDE SERPL-SCNC: 101 MMOL/L — SIGNIFICANT CHANGE UP (ref 96–108)
CHLORIDE SERPL-SCNC: 106 MMOL/L — SIGNIFICANT CHANGE UP (ref 96–108)
CO2 SERPL-SCNC: 26 MMOL/L — SIGNIFICANT CHANGE UP (ref 22–31)
CO2 SERPL-SCNC: 28 MMOL/L — SIGNIFICANT CHANGE UP (ref 22–31)
CREAT SERPL-MCNC: 0.9 MG/DL — SIGNIFICANT CHANGE UP (ref 0.5–1.3)
CREAT SERPL-MCNC: 0.94 MG/DL — SIGNIFICANT CHANGE UP (ref 0.5–1.3)
EGFR: 103 ML/MIN/1.73M2 — SIGNIFICANT CHANGE UP
EGFR: 108 ML/MIN/1.73M2 — SIGNIFICANT CHANGE UP
GAS PNL BLDA: SIGNIFICANT CHANGE UP
GAS PNL BLDA: SIGNIFICANT CHANGE UP
GLUCOSE BLDC GLUCOMTR-MCNC: 114 MG/DL — HIGH (ref 70–99)
GLUCOSE SERPL-MCNC: 120 MG/DL — HIGH (ref 70–99)
GLUCOSE SERPL-MCNC: 134 MG/DL — HIGH (ref 70–99)
HCT VFR BLD CALC: 30.9 % — LOW (ref 39–50)
HCT VFR BLD CALC: 31.3 % — LOW (ref 39–50)
HGB BLD-MCNC: 9.6 G/DL — LOW (ref 13–17)
HGB BLD-MCNC: 9.8 G/DL — LOW (ref 13–17)
INR BLD: 0.88 — SIGNIFICANT CHANGE UP (ref 0.85–1.18)
INR BLD: 0.93 — SIGNIFICANT CHANGE UP (ref 0.85–1.18)
LACTATE SERPL-SCNC: 1.3 MMOL/L — SIGNIFICANT CHANGE UP (ref 0.5–2)
MAGNESIUM SERPL-MCNC: 2 MG/DL — SIGNIFICANT CHANGE UP (ref 1.6–2.6)
MAGNESIUM SERPL-MCNC: 2.1 MG/DL — SIGNIFICANT CHANGE UP (ref 1.6–2.6)
MCHC RBC-ENTMCNC: 21.9 PG — LOW (ref 27–34)
MCHC RBC-ENTMCNC: 22 PG — LOW (ref 27–34)
MCHC RBC-ENTMCNC: 31.1 GM/DL — LOW (ref 32–36)
MCHC RBC-ENTMCNC: 31.3 GM/DL — LOW (ref 32–36)
MCV RBC AUTO: 70.2 FL — LOW (ref 80–100)
MCV RBC AUTO: 70.4 FL — LOW (ref 80–100)
NRBC # BLD: 0 /100 WBCS — SIGNIFICANT CHANGE UP (ref 0–0)
NRBC # BLD: 0 /100 WBCS — SIGNIFICANT CHANGE UP (ref 0–0)
PHOSPHATE SERPL-MCNC: 3.1 MG/DL — SIGNIFICANT CHANGE UP (ref 2.5–4.5)
PLATELET # BLD AUTO: 225 K/UL — SIGNIFICANT CHANGE UP (ref 150–400)
PLATELET # BLD AUTO: 244 K/UL — SIGNIFICANT CHANGE UP (ref 150–400)
POTASSIUM SERPL-MCNC: 3.6 MMOL/L — SIGNIFICANT CHANGE UP (ref 3.5–5.3)
POTASSIUM SERPL-MCNC: 4.6 MMOL/L — SIGNIFICANT CHANGE UP (ref 3.5–5.3)
POTASSIUM SERPL-SCNC: 3.6 MMOL/L — SIGNIFICANT CHANGE UP (ref 3.5–5.3)
POTASSIUM SERPL-SCNC: 4.6 MMOL/L — SIGNIFICANT CHANGE UP (ref 3.5–5.3)
PROT SERPL-MCNC: 5.7 G/DL — LOW (ref 6–8.3)
PROT SERPL-MCNC: 6.2 G/DL — SIGNIFICANT CHANGE UP (ref 6–8.3)
PROTHROM AB SERPL-ACNC: 10.1 SEC — SIGNIFICANT CHANGE UP (ref 9.5–13)
PROTHROM AB SERPL-ACNC: 10.6 SEC — SIGNIFICANT CHANGE UP (ref 9.5–13)
RBC # BLD: 4.39 M/UL — SIGNIFICANT CHANGE UP (ref 4.2–5.8)
RBC # BLD: 4.46 M/UL — SIGNIFICANT CHANGE UP (ref 4.2–5.8)
RBC # FLD: 13.9 % — SIGNIFICANT CHANGE UP (ref 10.3–14.5)
RBC # FLD: 14.3 % — SIGNIFICANT CHANGE UP (ref 10.3–14.5)
SODIUM SERPL-SCNC: 135 MMOL/L — SIGNIFICANT CHANGE UP (ref 135–145)
SODIUM SERPL-SCNC: 140 MMOL/L — SIGNIFICANT CHANGE UP (ref 135–145)
WBC # BLD: 11.22 K/UL — HIGH (ref 3.8–10.5)
WBC # BLD: 11.35 K/UL — HIGH (ref 3.8–10.5)
WBC # FLD AUTO: 11.22 K/UL — HIGH (ref 3.8–10.5)
WBC # FLD AUTO: 11.35 K/UL — HIGH (ref 3.8–10.5)

## 2024-02-28 PROCEDURE — 71045 X-RAY EXAM CHEST 1 VIEW: CPT | Mod: 26

## 2024-02-28 PROCEDURE — 99291 CRITICAL CARE FIRST HOUR: CPT

## 2024-02-28 RX ORDER — FENTANYL CITRATE 50 UG/ML
25 INJECTION INTRAVENOUS ONCE
Refills: 0 | Status: DISCONTINUED | OUTPATIENT
Start: 2024-02-28 | End: 2024-02-28

## 2024-02-28 RX ORDER — SODIUM CHLORIDE 9 MG/ML
3 INJECTION INTRAMUSCULAR; INTRAVENOUS; SUBCUTANEOUS EVERY 8 HOURS
Refills: 0 | Status: DISCONTINUED | OUTPATIENT
Start: 2024-02-28 | End: 2024-03-02

## 2024-02-28 RX ORDER — MAGNESIUM OXIDE 400 MG ORAL TABLET 241.3 MG
400 TABLET ORAL ONCE
Refills: 0 | Status: COMPLETED | OUTPATIENT
Start: 2024-02-28 | End: 2024-02-28

## 2024-02-28 RX ORDER — METOPROLOL TARTRATE 50 MG
12.5 TABLET ORAL EVERY 12 HOURS
Refills: 0 | Status: DISCONTINUED | OUTPATIENT
Start: 2024-02-28 | End: 2024-02-29

## 2024-02-28 RX ORDER — GABAPENTIN 400 MG/1
100 CAPSULE ORAL THREE TIMES A DAY
Refills: 0 | Status: DISCONTINUED | OUTPATIENT
Start: 2024-02-28 | End: 2024-03-02

## 2024-02-28 RX ORDER — SODIUM CHLORIDE 9 MG/ML
1000 INJECTION, SOLUTION INTRAVENOUS ONCE
Refills: 0 | Status: COMPLETED | OUTPATIENT
Start: 2024-02-28 | End: 2024-02-28

## 2024-02-28 RX ORDER — OXYCODONE HYDROCHLORIDE 5 MG/1
5 TABLET ORAL EVERY 6 HOURS
Refills: 0 | Status: DISCONTINUED | OUTPATIENT
Start: 2024-02-28 | End: 2024-03-02

## 2024-02-28 RX ORDER — POTASSIUM CHLORIDE 20 MEQ
20 PACKET (EA) ORAL
Refills: 0 | Status: COMPLETED | OUTPATIENT
Start: 2024-02-28 | End: 2024-02-28

## 2024-02-28 RX ORDER — POTASSIUM CHLORIDE 20 MEQ
40 PACKET (EA) ORAL ONCE
Refills: 0 | Status: COMPLETED | OUTPATIENT
Start: 2024-02-28 | End: 2024-02-28

## 2024-02-28 RX ORDER — PANTOPRAZOLE SODIUM 20 MG/1
40 TABLET, DELAYED RELEASE ORAL
Refills: 0 | Status: DISCONTINUED | OUTPATIENT
Start: 2024-02-28 | End: 2024-03-02

## 2024-02-28 RX ORDER — ACETAMINOPHEN 500 MG
975 TABLET ORAL EVERY 6 HOURS
Refills: 0 | Status: DISCONTINUED | OUTPATIENT
Start: 2024-02-28 | End: 2024-03-02

## 2024-02-28 RX ADMIN — Medication 50 MILLIEQUIVALENT(S): at 06:02

## 2024-02-28 RX ADMIN — MUPIROCIN 1 APPLICATION(S): 20 OINTMENT TOPICAL at 18:12

## 2024-02-28 RX ADMIN — HEPARIN SODIUM 5000 UNIT(S): 5000 INJECTION INTRAVENOUS; SUBCUTANEOUS at 14:32

## 2024-02-28 RX ADMIN — FENTANYL CITRATE 25 MICROGRAM(S): 50 INJECTION INTRAVENOUS at 11:25

## 2024-02-28 RX ADMIN — FENTANYL CITRATE 25 MICROGRAM(S): 50 INJECTION INTRAVENOUS at 01:20

## 2024-02-28 RX ADMIN — OXYCODONE HYDROCHLORIDE 5 MILLIGRAM(S): 5 TABLET ORAL at 09:00

## 2024-02-28 RX ADMIN — HEPARIN SODIUM 5000 UNIT(S): 5000 INJECTION INTRAVENOUS; SUBCUTANEOUS at 21:49

## 2024-02-28 RX ADMIN — Medication 81 MILLIGRAM(S): at 11:15

## 2024-02-28 RX ADMIN — Medication 100 MILLIGRAM(S): at 16:49

## 2024-02-28 RX ADMIN — OXYCODONE HYDROCHLORIDE 5 MILLIGRAM(S): 5 TABLET ORAL at 15:06

## 2024-02-28 RX ADMIN — OXYCODONE HYDROCHLORIDE 5 MILLIGRAM(S): 5 TABLET ORAL at 14:37

## 2024-02-28 RX ADMIN — Medication 1000 MILLIGRAM(S): at 11:25

## 2024-02-28 RX ADMIN — Medication 500 MILLIGRAM(S): at 18:06

## 2024-02-28 RX ADMIN — SODIUM CHLORIDE 3 MILLILITER(S): 9 INJECTION INTRAMUSCULAR; INTRAVENOUS; SUBCUTANEOUS at 21:24

## 2024-02-28 RX ADMIN — OXYCODONE HYDROCHLORIDE 5 MILLIGRAM(S): 5 TABLET ORAL at 20:45

## 2024-02-28 RX ADMIN — OXYCODONE HYDROCHLORIDE 5 MILLIGRAM(S): 5 TABLET ORAL at 21:45

## 2024-02-28 RX ADMIN — SENNA PLUS 2 TABLET(S): 8.6 TABLET ORAL at 21:49

## 2024-02-28 RX ADMIN — Medication 1000 MILLIGRAM(S): at 05:55

## 2024-02-28 RX ADMIN — FENTANYL CITRATE 25 MICROGRAM(S): 50 INJECTION INTRAVENOUS at 05:20

## 2024-02-28 RX ADMIN — Medication 500 MILLIGRAM(S): at 05:45

## 2024-02-28 RX ADMIN — GABAPENTIN 100 MILLIGRAM(S): 400 CAPSULE ORAL at 14:32

## 2024-02-28 RX ADMIN — Medication 400 MILLIGRAM(S): at 11:10

## 2024-02-28 RX ADMIN — Medication 1000 MILLIGRAM(S): at 00:10

## 2024-02-28 RX ADMIN — FENTANYL CITRATE 25 MICROGRAM(S): 50 INJECTION INTRAVENOUS at 05:04

## 2024-02-28 RX ADMIN — Medication 12.5 MILLIGRAM(S): at 18:06

## 2024-02-28 RX ADMIN — Medication 30 MILLIGRAM(S): at 13:53

## 2024-02-28 RX ADMIN — Medication 50 MILLIEQUIVALENT(S): at 07:19

## 2024-02-28 RX ADMIN — GABAPENTIN 100 MILLIGRAM(S): 400 CAPSULE ORAL at 21:50

## 2024-02-28 RX ADMIN — Medication 100 MILLIGRAM(S): at 01:18

## 2024-02-28 RX ADMIN — Medication 100 MILLIGRAM(S): at 08:50

## 2024-02-28 RX ADMIN — SODIUM CHLORIDE 3 MILLILITER(S): 9 INJECTION INTRAMUSCULAR; INTRAVENOUS; SUBCUTANEOUS at 13:48

## 2024-02-28 RX ADMIN — Medication 400 MILLIGRAM(S): at 05:42

## 2024-02-28 RX ADMIN — Medication 30 MILLIGRAM(S): at 06:15

## 2024-02-28 RX ADMIN — OXYCODONE HYDROCHLORIDE 5 MILLIGRAM(S): 5 TABLET ORAL at 08:26

## 2024-02-28 RX ADMIN — Medication 30 MILLIGRAM(S): at 06:00

## 2024-02-28 RX ADMIN — Medication 24 MILLIGRAM(S): at 18:06

## 2024-02-28 RX ADMIN — Medication 30 MILLIGRAM(S): at 14:10

## 2024-02-28 RX ADMIN — ATORVASTATIN CALCIUM 40 MILLIGRAM(S): 80 TABLET, FILM COATED ORAL at 21:49

## 2024-02-28 RX ADMIN — SODIUM CHLORIDE 1000 MILLILITER(S): 9 INJECTION, SOLUTION INTRAVENOUS at 04:32

## 2024-02-28 RX ADMIN — MAGNESIUM OXIDE 400 MG ORAL TABLET 400 MILLIGRAM(S): 241.3 TABLET ORAL at 05:45

## 2024-02-28 RX ADMIN — Medication 975 MILLIGRAM(S): at 18:05

## 2024-02-28 RX ADMIN — Medication 975 MILLIGRAM(S): at 19:00

## 2024-02-28 RX ADMIN — MUPIROCIN 1 APPLICATION(S): 20 OINTMENT TOPICAL at 07:55

## 2024-02-28 RX ADMIN — FENTANYL CITRATE 25 MICROGRAM(S): 50 INJECTION INTRAVENOUS at 01:05

## 2024-02-28 RX ADMIN — FENTANYL CITRATE 25 MICROGRAM(S): 50 INJECTION INTRAVENOUS at 11:10

## 2024-02-28 RX ADMIN — HEPARIN SODIUM 5000 UNIT(S): 5000 INJECTION INTRAVENOUS; SUBCUTANEOUS at 05:45

## 2024-02-28 RX ADMIN — Medication 50 MILLIEQUIVALENT(S): at 03:11

## 2024-02-28 NOTE — PROGRESS NOTE ADULT - SUBJECTIVE AND OBJECTIVE BOX
Patient discussed on morning rounds with Dr. Verde     Operation / Date: 2/27/24, Transposition of the RCA, EF 65%     SUBJECTIVE ASSESSMENT:  44y Male        Vital Signs Last 24 Hrs  T(C): 36.8 (28 Feb 2024 13:57), Max: 36.9 (28 Feb 2024 05:02)  T(F): 98.3 (28 Feb 2024 13:57), Max: 98.4 (28 Feb 2024 05:02)  HR: 110 (28 Feb 2024 14:00) (78 - 112)  BP: 118/73 (28 Feb 2024 14:00) (99/62 - 118/73)  BP(mean): 79 (28 Feb 2024 14:00) (75 - 80)  RR: 12 (28 Feb 2024 14:00) (12 - 22)  SpO2: 99% (28 Feb 2024 14:00) (96% - 100%)    Parameters below as of 28 Feb 2024 15:00  Patient On (Oxygen Delivery Method): room air      I&O's Detail    27 Feb 2024 07:01  -  28 Feb 2024 07:00  --------------------------------------------------------  IN:    Dexmedetomidine: 15.8 mL    IV PiggyBack: 700 mL    Lactated Ringers Bolus: 1000 mL    Oral Fluid: 295 mL    sodium chloride 0.9%: 180 mL  Total IN: 2190.8 mL    OUT:    Drain (mL): 300 mL    Drain (mL): 300 mL    Indwelling Catheter - Urethral (mL): 2440 mL    Nasogastric/Oral tube (mL): 50 mL  Total OUT: 3090 mL    Total NET: -899.2 mL      28 Feb 2024 07:01  -  28 Feb 2024 16:35  --------------------------------------------------------  IN:    IV PiggyBack: 200 mL    sodium chloride 0.9%: 40 mL  Total IN: 240 mL    OUT:    Drain (mL): 75 mL    Drain (mL): 100 mL    Indwelling Catheter - Urethral (mL): 330 mL  Total OUT: 505 mL    Total NET: -265 mL          CHEST TUBE:  Yes  SHARON DRAIN:  Yes  EPICARDIAL WIRES: Yes  TIE DOWNS: Yes  JAEGER: Yes    PHYSICAL EXAM:    General:     Neurological:    Cardiovascular:    Respiratory:    Gastrointestinal:    Extremities:    Vascular:    Incision Sites:    LABS:                        9.8    11.22 )-----------( 244      ( 28 Feb 2024 09:38 )             31.3     PT/INR - ( 28 Feb 2024 09:38 )   PT: 10.1 sec;   INR: 0.88          PTT - ( 28 Feb 2024 02:11 )  PTT:31.4 sec    02-28    140  |  106  |  7   ----------------------------<  134<H>  4.6   |  28  |  0.94    Ca    9.0      28 Feb 2024 09:38  Phos  3.1     02-28  Mg     2.1     02-28    TPro  6.2  /  Alb  3.6  /  TBili  0.5  /  DBili  x   /  AST  26  /  ALT  15  /  AlkPhos  39<L>  02-28      Urinalysis Basic - ( 28 Feb 2024 09:38 )    Color: x / Appearance: x / SG: x / pH: x  Gluc: 134 mg/dL / Ketone: x  / Bili: x / Urobili: x   Blood: x / Protein: x / Nitrite: x   Leuk Esterase: x / RBC: x / WBC x   Sq Epi: x / Non Sq Epi: x / Bacteria: x        MEDICATIONS  (STANDING):  acetaminophen     Tablet .. 975 milliGRAM(s) Oral every 6 hours  ascorbic acid 500 milliGRAM(s) Oral two times a day  aspirin enteric coated 81 milliGRAM(s) Oral daily  atorvastatin 40 milliGRAM(s) Oral at bedtime  ceFAZolin   IVPB 2000 milliGRAM(s) IV Intermittent every 8 hours  chlorhexidine 2% Cloths 1 Application(s) Topical daily  gabapentin 100 milliGRAM(s) Oral three times a day  heparin   Injectable 5000 Unit(s) SubCutaneous every 8 hours  metoprolol tartrate 12.5 milliGRAM(s) Oral every 12 hours  mupirocin 2% Ointment 1 Application(s) Both Nostrils two times a day  pantoprazole    Tablet 40 milliGRAM(s) Oral before breakfast  polyethylene glycol 3350 17 Gram(s) Oral daily  senna 2 Tablet(s) Oral at bedtime  sodium chloride 0.9% lock flush 3 milliLiter(s) IV Push every 8 hours    MEDICATIONS  (PRN):  ketorolac   Injectable 30 milliGRAM(s) IV Push every 6 hours PRN Severe Pain (7 - 10)  oxyCODONE    IR 5 milliGRAM(s) Oral every 6 hours PRN Moderate Pain (4 - 6)        RADIOLOGY & ADDITIONAL TESTS:     Patient discussed on morning rounds with Dr. Verde     Operation / Date: 2/27/24, Transposition of the RCA, EF 65%     SUBJECTIVE ASSESSMENT:  44y Male complaining of some chest pain but EKG shows signs of pericarditis. Ambulating well, voiding on his own, IS at 1000cc. Denies any fevers, chills, headache, lightheadedness, dizziness, chest pain, shortness of breath, abdominal pain, nausea, vomiting, changes in bowel or bladder, paresthesias, or any other acute complaint.        Vital Signs Last 24 Hrs  T(C): 36.8 (28 Feb 2024 13:57), Max: 36.9 (28 Feb 2024 05:02)  T(F): 98.3 (28 Feb 2024 13:57), Max: 98.4 (28 Feb 2024 05:02)  HR: 110 (28 Feb 2024 14:00) (78 - 112)  BP: 118/73 (28 Feb 2024 14:00) (99/62 - 118/73)  BP(mean): 79 (28 Feb 2024 14:00) (75 - 80)  RR: 12 (28 Feb 2024 14:00) (12 - 22)  SpO2: 99% (28 Feb 2024 14:00) (96% - 100%)    Parameters below as of 28 Feb 2024 15:00  Patient On (Oxygen Delivery Method): room air      I&O's Detail    27 Feb 2024 07:01  -  28 Feb 2024 07:00  --------------------------------------------------------  IN:    Dexmedetomidine: 15.8 mL    IV PiggyBack: 700 mL    Lactated Ringers Bolus: 1000 mL    Oral Fluid: 295 mL    sodium chloride 0.9%: 180 mL  Total IN: 2190.8 mL    OUT:    Drain (mL): 300 mL    Drain (mL): 300 mL    Indwelling Catheter - Urethral (mL): 2440 mL    Nasogastric/Oral tube (mL): 50 mL  Total OUT: 3090 mL    Total NET: -899.2 mL      28 Feb 2024 07:01  -  28 Feb 2024 16:35  --------------------------------------------------------  IN:    IV PiggyBack: 200 mL    sodium chloride 0.9%: 40 mL  Total IN: 240 mL    OUT:    Drain (mL): 75 mL    Drain (mL): 100 mL    Indwelling Catheter - Urethral (mL): 330 mL  Total OUT: 505 mL    Total NET: -265 mL          CHEST TUBE:  Yes  SHARON DRAIN:  Yes  EPICARDIAL WIRES: Yes  TIE DOWNS: Yes  BRADY: Yes    PHYSICAL EXAM:    General: Sitting in bed comfortably in NAD  Neuro: A&Ox3, no focal deficits   HEENT: NCAT, EOMI   Cardiac: Regular rate and rhythm, normal S1 and S2. No m/r/g   Pulm: Breathing comfortably on RA. No signs of respiratory distress. Lungs are CTA b/l without wheezes, rales, or rhonchi   Abdomen: Soft, non-distended, non-tender. + bowel sounds   : No brady  Extremities: Warm and well perfused, no peripheral edema, distal pulses 2+. No calf tenderness. SCDs and TEDs in place  MSK: Full AROM   Wound: MSI with mepliex in plae, chest tube sites not saturated. Sites c/d/i       LABS:                        9.8    11.22 )-----------( 244      ( 28 Feb 2024 09:38 )             31.3     PT/INR - ( 28 Feb 2024 09:38 )   PT: 10.1 sec;   INR: 0.88          PTT - ( 28 Feb 2024 02:11 )  PTT:31.4 sec    02-28    140  |  106  |  7   ----------------------------<  134<H>  4.6   |  28  |  0.94    Ca    9.0      28 Feb 2024 09:38  Phos  3.1     02-28  Mg     2.1     02-28    TPro  6.2  /  Alb  3.6  /  TBili  0.5  /  DBili  x   /  AST  26  /  ALT  15  /  AlkPhos  39<L>  02-28      Urinalysis Basic - ( 28 Feb 2024 09:38 )    Color: x / Appearance: x / SG: x / pH: x  Gluc: 134 mg/dL / Ketone: x  / Bili: x / Urobili: x   Blood: x / Protein: x / Nitrite: x   Leuk Esterase: x / RBC: x / WBC x   Sq Epi: x / Non Sq Epi: x / Bacteria: x        MEDICATIONS  (STANDING):  acetaminophen     Tablet .. 975 milliGRAM(s) Oral every 6 hours  ascorbic acid 500 milliGRAM(s) Oral two times a day  aspirin enteric coated 81 milliGRAM(s) Oral daily  atorvastatin 40 milliGRAM(s) Oral at bedtime  ceFAZolin   IVPB 2000 milliGRAM(s) IV Intermittent every 8 hours  chlorhexidine 2% Cloths 1 Application(s) Topical daily  gabapentin 100 milliGRAM(s) Oral three times a day  heparin   Injectable 5000 Unit(s) SubCutaneous every 8 hours  metoprolol tartrate 12.5 milliGRAM(s) Oral every 12 hours  mupirocin 2% Ointment 1 Application(s) Both Nostrils two times a day  pantoprazole    Tablet 40 milliGRAM(s) Oral before breakfast  polyethylene glycol 3350 17 Gram(s) Oral daily  senna 2 Tablet(s) Oral at bedtime  sodium chloride 0.9% lock flush 3 milliLiter(s) IV Push every 8 hours    MEDICATIONS  (PRN):  ketorolac   Injectable 30 milliGRAM(s) IV Push every 6 hours PRN Severe Pain (7 - 10)  oxyCODONE    IR 5 milliGRAM(s) Oral every 6 hours PRN Moderate Pain (4 - 6)        RADIOLOGY & ADDITIONAL TESTS:  < from: Xray Chest 1 View- PORTABLE-Routine (Xray Chest 1 View- PORTABLE-Routine in AM.) (02.28.24 @ 05:24) >  Findings/  impression: Right perihilar focal atelectasis, new. Satisfactory   positioning of support devices. Heart size within normal limits, status   post median sternotomy. Mediastinum and thorax are unremarkable.

## 2024-02-28 NOTE — PHYSICAL THERAPY INITIAL EVALUATION ADULT - PERTINENT HX OF CURRENT PROBLEM, REHAB EVAL
Patient is 44 YOM, current marijuana user, PMHx NSVT, HLD, anomalous RCA  who initially presented to outpatient cardiologist Dr. Morrissey  c/o an episode of chest pressure, palpitations, VANG in January 2023 during a trip in [place] after the patient used illicit drugs, drank strong coffee and had lack of sleep. Sx resolved after 1 hour on its own. Patient also reports that during times of exercise he feels chest pressure and palpitations. Patient was referred to Dr. Verde after incidental finding of anomalous RCA on CCTA.

## 2024-02-28 NOTE — PHYSICAL THERAPY INITIAL EVALUATION ADULT - GENERAL OBSERVATIONS, REHAB EVAL
Patient received out of bed sitting in bedside chair in no apparent distress, +NC @ 2L/min +EKG +evelyn +central line +3x jess +brady +SCDs

## 2024-02-28 NOTE — PROGRESS NOTE ADULT - ASSESSMENT
44 M current marijuana user, PMHx NSVT, HLD, anomalous RCA and recently worsening of intermittent chest pain specially on exertion.  Exercise Stress Echo 3/15/23: EF 65%, aortic valve is tricuspid, 1.5mm ST depression in leads II, III and avF, V4 and V5 during peak stress period   CCTA 12/5/23: calcium score 0, RCA has an anomalous take off from the left coronary cusp, no evidence of coronary atherosclerosis   In light of patient's risk factors, abnormal CCTA results, and known anomalous RCA patient is referred to [facility] for pre-op cardiac catheterization for RCA transposition.  S/p RCA transposition   EF 65%   2/27  Arrived to CTU intubated on no infusions, Normal cardiac indices  Extubated shortly  No issues overnight   A/p  Hemodynamically stable sinus devora in high 90s, likely pain related / ECG with pericarditic changes   to achieve adequate pain control/ Adding toradol to pain regimen   Clinically stable, warm and euvolemic with great autodiuresis and no evidence of RV dysfx as of yet--> will start low dose BB later if remains tachycardic   Good glycemic control/ ADAT  Normal kidney fx, with great autodiresis after volume repletion, lytes supplemented, Neg FB-- PRN diuretics for even to gently neg FB goal -- clinically euvolemic  Blakes with steady moderate output-- thin and serosang/ H&H stable, continue on suction   ICU ppx  OOB and mobilizing as tolerated with JACQUELINE Brown dc'victoriano smith, carlos [facility] eligible     ATTENDING: I have personally and independently provided 105 min of critical care services. This excludes any time spent on separate procedures or teaching.

## 2024-02-28 NOTE — PHYSICAL THERAPY INITIAL EVALUATION ADULT - ADDITIONAL COMMENTS
Patient lives alone in elevator apartment, +0STE, +tub shower. Patient states he will be staying with his mother during his recovery (2 story private house +3STE). PTA, patient was independent with all ADLs and functional mobility, without the use of any AD.

## 2024-02-28 NOTE — PROGRESS NOTE ADULT - ASSESSMENT
44 yoM, current marijuana user, PMHx NSVT, HLD, anomalous RCA who initially presented to outpatient cardiologist, Dr. Morrissey, c/o an episode of chest pressure, palpitations, VANG in January 2023 during a trip in [place] after the patient used illicit drugs, drank strong coffee and had lack of sleep. Patient also reports that during times of exercise he feels chest pressure and palpitations. Patient was referred to Dr. Verde after incidental finding of anomalous RCA on CCTA. Exercise Stress Echo 3/15/23: EF 65%, aortic valve is tricuspid, 1.5mm ST depression in leads II, III and avF, V4 and V5 during peak stress period. CCTA 12/5/23: calcium score 0, RCA has an anomalous take off from the left coronary cusp, no evidence of coronary atherosclerosis. TTE 3/15/23: LVSF normal with EF 66%, mild MR, mildly dilated LA. He underwent cardiac cath on 2/26/24 which revealed normal coronaries. On 2/27/24, he underwent a transposition of the RCA with Dr. Verde. Postoperatively, he arrived to the CTICU on no gtts and extubated. His EKG was consistent with pericarditis and he has been given toradol for this. On POD 1, he was delined but central line remains. Transferred to the [facility]. He was started on beta blocker.     Neuro:   Pain well controlled on standing APAP, gabapentin, PRN oxycodone    No delirium, no focal deficits     Cardiac:   POD 1 s/p Transposition of the RCA   - Chest tubes and Blakes, PW remain   - ASA 81 mg, metoprolol 12.5 mg q12, Lipitor 40 mg   - continue ERP medications  - TLC remains   Post-Op Pericarditis   - treated in ICU with Toradol 30 mg, will transition to medrol dose pack   Vital signs stable over last 24 hours   - HR: 110  - BP: 118/73  HTN:   - metoprolol 12.5 mg     Pulm:   Saturating well on room air   Encouraging IS   CXR: stable    GI:   Tolerating diet well   GI PPx: Protonix  Continue with bowel regimen     Renal:   Monitor I/Os   BUN/Cr stable @ 7/0.94    Heme:   H&H stable @ 9.8/21.3  DVT prophylaxis: SCDs and SQH 5000U    ID:   Afebrile, WBC stable @ 11.22  Monitor fever curve     MSK:   Encourage ambulation   PT/OT     Endocrine:   No Hx of DM or Thyroid Disease  - A1C: 4.7  - TSH: 1.770  Monitor blood glucose levels     Wound:  Monitor for SSI    Dispo:  Inpatient management

## 2024-02-28 NOTE — PHYSICAL THERAPY INITIAL EVALUATION ADULT - MANUAL MUSCLE TESTING RESULTS, REHAB EVAL
b/l UE and LE grossly assessed as >3+/5 via functional mobility, except b/l UE flexion and horiz abd 3-/5 in adherence to sternal precautions

## 2024-02-28 NOTE — PROGRESS NOTE ADULT - SUBJECTIVE AND OBJECTIVE BOX
INTERVAL COURSE  POD#1   RCA transposition  Ef 65%   No intraop blood products, arrived on no drips - Normal cardiac indices   Extubated shortly   OOB complaining of incisional pain    VITALS  Vital Signs Last 24 Hrs  T(C): 36.6 (28 Feb 2024 09:25), Max: 36.9 (28 Feb 2024 05:02)  T(F): 97.9 (28 Feb 2024 09:25), Max: 98.4 (28 Feb 2024 05:02)  HR: 107 (28 Feb 2024 09:00) (75 - 107)  BP: 124/56  BP(mean): 74  RR: 12 (28 Feb 2024 09:00) (12 - 22)  SpO2: 100% (28 Feb 2024 09:00) (96% - 100%)  Parameters below as of 28 Feb 2024 09:00  Patient On (Oxygen Delivery Method): nasal cannula w/ humidification  O2 Flow (L/min): 2    I&O's Summary  27 Feb 2024 07:01  -  28 Feb 2024 07:00  --------------------------------------------------------  IN: 2190.8 mL / OUT: 3090 mL / NET: -899.2 mL    CAPILLARY BLOOD GLUCOSE  POCT Blood Glucose.: 114 mg/dL (27 Feb 2024 19:01)    PHYSICAL EXAM  General: A&Ox 3; NAD  Respiratory: Diminished basilar breath sounds otherwise clear, no wheezing    Cardiovascular: Sinus tachycardia   Gastrointestinal: Soft; NTND  Extremities: WWP; no edema  Neurological:  CNII-XII grossly intact; no obvious focal deficits    IMAGING/EKG/ETC  Reviewed.

## 2024-02-29 LAB
ALBUMIN SERPL ELPH-MCNC: 3.9 G/DL — SIGNIFICANT CHANGE UP (ref 3.3–5)
ALP SERPL-CCNC: 44 U/L — SIGNIFICANT CHANGE UP (ref 40–120)
ALT FLD-CCNC: 12 U/L — SIGNIFICANT CHANGE UP (ref 10–45)
ANION GAP SERPL CALC-SCNC: 10 MMOL/L — SIGNIFICANT CHANGE UP (ref 5–17)
ANISOCYTOSIS BLD QL: SLIGHT — SIGNIFICANT CHANGE UP
APTT BLD: 29.3 SEC — SIGNIFICANT CHANGE UP (ref 24.5–35.6)
AST SERPL-CCNC: 24 U/L — SIGNIFICANT CHANGE UP (ref 10–40)
BASE EXCESS BLDV CALC-SCNC: 3.7 MMOL/L — HIGH (ref -2–3)
BASOPHILS # BLD AUTO: 0 K/UL — SIGNIFICANT CHANGE UP (ref 0–0.2)
BASOPHILS NFR BLD AUTO: 0 % — SIGNIFICANT CHANGE UP (ref 0–2)
BILIRUB SERPL-MCNC: 0.4 MG/DL — SIGNIFICANT CHANGE UP (ref 0.2–1.2)
BUN SERPL-MCNC: 8 MG/DL — SIGNIFICANT CHANGE UP (ref 7–23)
CALCIUM SERPL-MCNC: 9.4 MG/DL — SIGNIFICANT CHANGE UP (ref 8.4–10.5)
CHLORIDE SERPL-SCNC: 101 MMOL/L — SIGNIFICANT CHANGE UP (ref 96–108)
CO2 BLDV-SCNC: 31.2 MMOL/L — HIGH (ref 22–26)
CO2 SERPL-SCNC: 27 MMOL/L — SIGNIFICANT CHANGE UP (ref 22–31)
CREAT SERPL-MCNC: 1.01 MG/DL — SIGNIFICANT CHANGE UP (ref 0.5–1.3)
DACRYOCYTES BLD QL SMEAR: SLIGHT — SIGNIFICANT CHANGE UP
EGFR: 94 ML/MIN/1.73M2 — SIGNIFICANT CHANGE UP
EOSINOPHIL # BLD AUTO: 0 K/UL — SIGNIFICANT CHANGE UP (ref 0–0.5)
EOSINOPHIL NFR BLD AUTO: 0 % — SIGNIFICANT CHANGE UP (ref 0–6)
GAS PNL BLDV: SIGNIFICANT CHANGE UP
GLUCOSE SERPL-MCNC: 117 MG/DL — HIGH (ref 70–99)
HCO3 BLDV-SCNC: 30 MMOL/L — HIGH (ref 22–29)
HCT VFR BLD CALC: 30.9 % — LOW (ref 39–50)
HGB BLD-MCNC: 9.6 G/DL — LOW (ref 13–17)
HYPOCHROMIA BLD QL: SIGNIFICANT CHANGE UP
INR BLD: 0.94 — SIGNIFICANT CHANGE UP (ref 0.85–1.18)
LACTATE SERPL-SCNC: 0.6 MMOL/L — SIGNIFICANT CHANGE UP (ref 0.5–2)
LYMPHOCYTES # BLD AUTO: 1.07 K/UL — SIGNIFICANT CHANGE UP (ref 1–3.3)
LYMPHOCYTES # BLD AUTO: 7 % — LOW (ref 13–44)
MAGNESIUM SERPL-MCNC: 2.1 MG/DL — SIGNIFICANT CHANGE UP (ref 1.6–2.6)
MAGNESIUM SERPL-MCNC: 2.2 MG/DL — SIGNIFICANT CHANGE UP (ref 1.6–2.6)
MANUAL SMEAR VERIFICATION: SIGNIFICANT CHANGE UP
MCHC RBC-ENTMCNC: 21.9 PG — LOW (ref 27–34)
MCHC RBC-ENTMCNC: 31.1 GM/DL — LOW (ref 32–36)
MCV RBC AUTO: 70.4 FL — LOW (ref 80–100)
MICROCYTES BLD QL: SLIGHT — SIGNIFICANT CHANGE UP
MONOCYTES # BLD AUTO: 0.4 K/UL — SIGNIFICANT CHANGE UP (ref 0–0.9)
MONOCYTES NFR BLD AUTO: 2.6 % — SIGNIFICANT CHANGE UP (ref 2–14)
NEUTROPHILS # BLD AUTO: 13.84 K/UL — HIGH (ref 1.8–7.4)
NEUTROPHILS NFR BLD AUTO: 90.4 % — HIGH (ref 43–77)
OVALOCYTES BLD QL SMEAR: SLIGHT — SIGNIFICANT CHANGE UP
PCO2 BLDV: 49 MMHG — SIGNIFICANT CHANGE UP (ref 42–55)
PH BLDV: 7.39 — SIGNIFICANT CHANGE UP (ref 7.32–7.43)
PHOSPHATE SERPL-MCNC: 2 MG/DL — LOW (ref 2.5–4.5)
PLAT MORPH BLD: NORMAL — SIGNIFICANT CHANGE UP
PLATELET # BLD AUTO: 250 K/UL — SIGNIFICANT CHANGE UP (ref 150–400)
PO2 BLDV: 38 MMHG — SIGNIFICANT CHANGE UP (ref 25–45)
POLYCHROMASIA BLD QL SMEAR: SLIGHT — SIGNIFICANT CHANGE UP
POTASSIUM SERPL-MCNC: 4.4 MMOL/L — SIGNIFICANT CHANGE UP (ref 3.5–5.3)
POTASSIUM SERPL-SCNC: 4.4 MMOL/L — SIGNIFICANT CHANGE UP (ref 3.5–5.3)
PROT SERPL-MCNC: 7.1 G/DL — SIGNIFICANT CHANGE UP (ref 6–8.3)
PROTHROM AB SERPL-ACNC: 10.7 SEC — SIGNIFICANT CHANGE UP (ref 9.5–13)
RBC # BLD: 4.39 M/UL — SIGNIFICANT CHANGE UP (ref 4.2–5.8)
RBC # FLD: 14.3 % — SIGNIFICANT CHANGE UP (ref 10.3–14.5)
RBC BLD AUTO: ABNORMAL
SAO2 % BLDV: 64.2 % — LOW (ref 67–88)
SODIUM SERPL-SCNC: 138 MMOL/L — SIGNIFICANT CHANGE UP (ref 135–145)
WBC # BLD: 15.31 K/UL — HIGH (ref 3.8–10.5)
WBC # FLD AUTO: 15.31 K/UL — HIGH (ref 3.8–10.5)

## 2024-02-29 PROCEDURE — 71045 X-RAY EXAM CHEST 1 VIEW: CPT | Mod: 26

## 2024-02-29 RX ORDER — METOPROLOL TARTRATE 50 MG
12.5 TABLET ORAL ONCE
Refills: 0 | Status: COMPLETED | OUTPATIENT
Start: 2024-02-29 | End: 2024-02-29

## 2024-02-29 RX ORDER — METOPROLOL TARTRATE 50 MG
25 TABLET ORAL
Refills: 0 | Status: DISCONTINUED | OUTPATIENT
Start: 2024-02-29 | End: 2024-03-02

## 2024-02-29 RX ADMIN — CHLORHEXIDINE GLUCONATE 1 APPLICATION(S): 213 SOLUTION TOPICAL at 06:00

## 2024-02-29 RX ADMIN — Medication 12.5 MILLIGRAM(S): at 13:20

## 2024-02-29 RX ADMIN — Medication 975 MILLIGRAM(S): at 18:50

## 2024-02-29 RX ADMIN — OXYCODONE HYDROCHLORIDE 5 MILLIGRAM(S): 5 TABLET ORAL at 19:46

## 2024-02-29 RX ADMIN — SODIUM CHLORIDE 3 MILLILITER(S): 9 INJECTION INTRAMUSCULAR; INTRAVENOUS; SUBCUTANEOUS at 14:24

## 2024-02-29 RX ADMIN — Medication 81 MILLIGRAM(S): at 12:19

## 2024-02-29 RX ADMIN — OXYCODONE HYDROCHLORIDE 5 MILLIGRAM(S): 5 TABLET ORAL at 20:33

## 2024-02-29 RX ADMIN — GABAPENTIN 100 MILLIGRAM(S): 400 CAPSULE ORAL at 13:19

## 2024-02-29 RX ADMIN — GABAPENTIN 100 MILLIGRAM(S): 400 CAPSULE ORAL at 05:27

## 2024-02-29 RX ADMIN — SODIUM CHLORIDE 3 MILLILITER(S): 9 INJECTION INTRAMUSCULAR; INTRAVENOUS; SUBCUTANEOUS at 22:31

## 2024-02-29 RX ADMIN — POLYETHYLENE GLYCOL 3350 17 GRAM(S): 17 POWDER, FOR SOLUTION ORAL at 12:18

## 2024-02-29 RX ADMIN — ATORVASTATIN CALCIUM 40 MILLIGRAM(S): 80 TABLET, FILM COATED ORAL at 21:34

## 2024-02-29 RX ADMIN — Medication 12.5 MILLIGRAM(S): at 05:27

## 2024-02-29 RX ADMIN — Medication 500 MILLIGRAM(S): at 05:27

## 2024-02-29 RX ADMIN — OXYCODONE HYDROCHLORIDE 5 MILLIGRAM(S): 5 TABLET ORAL at 05:30

## 2024-02-29 RX ADMIN — Medication 975 MILLIGRAM(S): at 00:00

## 2024-02-29 RX ADMIN — Medication 4 MILLIGRAM(S): at 19:46

## 2024-02-29 RX ADMIN — Medication 8 MILLIGRAM(S): at 21:34

## 2024-02-29 RX ADMIN — Medication 25 MILLIGRAM(S): at 17:57

## 2024-02-29 RX ADMIN — Medication 975 MILLIGRAM(S): at 01:00

## 2024-02-29 RX ADMIN — Medication 4 MILLIGRAM(S): at 06:09

## 2024-02-29 RX ADMIN — Medication 30 MILLIGRAM(S): at 10:41

## 2024-02-29 RX ADMIN — GABAPENTIN 100 MILLIGRAM(S): 400 CAPSULE ORAL at 21:34

## 2024-02-29 RX ADMIN — Medication 975 MILLIGRAM(S): at 13:15

## 2024-02-29 RX ADMIN — SODIUM CHLORIDE 3 MILLILITER(S): 9 INJECTION INTRAMUSCULAR; INTRAVENOUS; SUBCUTANEOUS at 05:48

## 2024-02-29 RX ADMIN — OXYCODONE HYDROCHLORIDE 5 MILLIGRAM(S): 5 TABLET ORAL at 06:30

## 2024-02-29 RX ADMIN — HEPARIN SODIUM 5000 UNIT(S): 5000 INJECTION INTRAVENOUS; SUBCUTANEOUS at 13:19

## 2024-02-29 RX ADMIN — Medication 500 MILLIGRAM(S): at 17:56

## 2024-02-29 RX ADMIN — MUPIROCIN 1 APPLICATION(S): 20 OINTMENT TOPICAL at 06:10

## 2024-02-29 RX ADMIN — Medication 975 MILLIGRAM(S): at 06:30

## 2024-02-29 RX ADMIN — Medication 4 MILLIGRAM(S): at 13:19

## 2024-02-29 RX ADMIN — Medication 85 MILLIMOLE(S): at 17:57

## 2024-02-29 RX ADMIN — HEPARIN SODIUM 5000 UNIT(S): 5000 INJECTION INTRAVENOUS; SUBCUTANEOUS at 05:28

## 2024-02-29 RX ADMIN — Medication 100 MILLIGRAM(S): at 00:20

## 2024-02-29 RX ADMIN — Medication 975 MILLIGRAM(S): at 12:21

## 2024-02-29 RX ADMIN — MUPIROCIN 1 APPLICATION(S): 20 OINTMENT TOPICAL at 17:57

## 2024-02-29 RX ADMIN — Medication 30 MILLIGRAM(S): at 11:00

## 2024-02-29 RX ADMIN — Medication 975 MILLIGRAM(S): at 17:56

## 2024-02-29 RX ADMIN — PANTOPRAZOLE SODIUM 40 MILLIGRAM(S): 20 TABLET, DELAYED RELEASE ORAL at 06:10

## 2024-02-29 RX ADMIN — HEPARIN SODIUM 5000 UNIT(S): 5000 INJECTION INTRAVENOUS; SUBCUTANEOUS at 21:34

## 2024-02-29 NOTE — PROGRESS NOTE ADULT - SUBJECTIVE AND OBJECTIVE BOX
Patient discussed on morning rounds with Dr. Verde    Operation / Date: 2/27: Transposition of RCA, EF 65%      SUBJECTIVE ASSESSMENT:  44y Male reports discomfort from the chest tubes but states that the pain is tolerable on current regimen. Patient states that he's passing flatus but has not had a bowel movement yet.         Vital Signs Last 24 Hrs  T(C): 36.2 (29 Feb 2024 09:13), Max: 37.7 (28 Feb 2024 17:00)  T(F): 97.2 (29 Feb 2024 09:13), Max: 99.9 (28 Feb 2024 17:00)  HR: 100 (29 Feb 2024 09:15) (100 - 112)  BP: 128/79 (29 Feb 2024 09:15) (110/55 - 143/66)  BP(mean): 96 (29 Feb 2024 09:15) (79 - 103)  RR: 19 (29 Feb 2024 09:15) (12 - 20)  SpO2: 99% (29 Feb 2024 09:15) (95% - 100%)    Parameters below as of 29 Feb 2024 09:15  Patient On (Oxygen Delivery Method): room air      I&O's Detail    28 Feb 2024 07:01  -  29 Feb 2024 07:00  --------------------------------------------------------  IN:    IV PiggyBack: 350 mL    Oral Fluid: 560 mL    sodium chloride 0.9%: 40 mL  Total IN: 950 mL    OUT:    Drain (mL): 175 mL    Drain (mL): 150 mL    Indwelling Catheter - Urethral (mL): 330 mL    Voided (mL): 2070 mL  Total OUT: 2725 mL    Total NET: -1775 mL      29 Feb 2024 07:01  -  29 Feb 2024 12:21  --------------------------------------------------------  IN:    Oral Fluid: 200 mL  Total IN: 200 mL    OUT:    Drain (mL): 25 mL    Drain (mL): 50 mL    Voided (mL): 400 mL  Total OUT: 475 mL    Total NET: -275 mL          CHEST TUBE:  Yes  SHARON DRAIN:  Yes  EPICARDIAL WIRES: Yes  TIE DOWNS: No.  JAEGER: No.    PHYSICAL EXAM:    GEN: NAD, looks comfortable  Neuro: A&Ox3.  No focal deficits.  Moving all extremities.   CV: S1S2, regular, no murmurs appreciated.  No carotid bruits.  No JVD  Lungs: decreased lung sounds bilaterally. No wheezing, rales or rhonchi  ABD: Soft, non-tender, non-distended.  +Bowel sounds  EXT: Warm and well perfused.  No peripheral edema noted. All Distal pulses palpable bilaterally.   Musculoskeletal: Moving all extremities with normal ROM, no joint swelling  Incisions: MSI c/d/i chest tube sites c/d/i    LABS:                        9.6    15.31 )-----------( 250      ( 29 Feb 2024 09:58 )             30.9     PT/INR - ( 29 Feb 2024 09:58 )   PT: 10.7 sec;   INR: 0.94          PTT - ( 29 Feb 2024 09:58 )  PTT:29.3 sec    02-29    138  |  101  |  8   ----------------------------<  117<H>  4.4   |  27  |  1.01    Ca    9.4      29 Feb 2024 09:58  Phos  2.0     02-29  Mg     2.2     02-29    TPro  7.1  /  Alb  3.9  /  TBili  0.4  /  DBili  x   /  AST  24  /  ALT  12  /  AlkPhos  44  02-29      Urinalysis Basic - ( 29 Feb 2024 09:58 )    Color: x / Appearance: x / SG: x / pH: x  Gluc: 117 mg/dL / Ketone: x  / Bili: x / Urobili: x   Blood: x / Protein: x / Nitrite: x   Leuk Esterase: x / RBC: x / WBC x   Sq Epi: x / Non Sq Epi: x / Bacteria: x        MEDICATIONS  (STANDING):  acetaminophen     Tablet .. 975 milliGRAM(s) Oral every 6 hours  ascorbic acid 500 milliGRAM(s) Oral two times a day  aspirin enteric coated 81 milliGRAM(s) Oral daily  atorvastatin 40 milliGRAM(s) Oral at bedtime  chlorhexidine 2% Cloths 1 Application(s) Topical daily  gabapentin 100 milliGRAM(s) Oral three times a day  heparin   Injectable 5000 Unit(s) SubCutaneous every 8 hours  methylPREDNISolone   Oral   methylPREDNISolone 4 milliGRAM(s) Oral before breakfast  methylPREDNISolone 4 milliGRAM(s) Oral after lunch  methylPREDNISolone 4 milliGRAM(s) Oral after dinner  methylPREDNISolone 8 milliGRAM(s) Oral at bedtime  metoprolol tartrate 12.5 milliGRAM(s) Oral every 12 hours  mupirocin 2% Ointment 1 Application(s) Both Nostrils two times a day  pantoprazole    Tablet 40 milliGRAM(s) Oral before breakfast  polyethylene glycol 3350 17 Gram(s) Oral daily  senna 2 Tablet(s) Oral at bedtime  sodium chloride 0.9% lock flush 3 milliLiter(s) IV Push every 8 hours    MEDICATIONS  (PRN):  ketorolac   Injectable 30 milliGRAM(s) IV Push every 6 hours PRN Severe Pain (7 - 10)  oxyCODONE    IR 5 milliGRAM(s) Oral every 6 hours PRN Moderate Pain (4 - 6)        RADIOLOGY & ADDITIONAL TESTS:    < from: Xray Chest 1 View- PORTABLE-Routine (Xray Chest 1 View- PORTABLE-Routine in AM.) (02.29.24 @ 05:27) >    ACC: [id number] EXAM:  XR CHEST PORTABLE ROUTINE 1V   ORDERED BY: OLIVER GARRIDO     PROCEDURE DATE:  02/29/2024          INTERPRETATION:  Portable chest    HISTORY: Postop cardiothoracic surgery follow-up    IMPRESSION:    [place] catheter removed since prior exam 2/28/2024. Otherwise unchanged.   Postop changes. No lung consolidation. No significant pleural effusion.   No definite pneumothorax.    --- End of Report ---    < end of copied text >

## 2024-02-29 NOTE — PROGRESS NOTE ADULT - ASSESSMENT
44 yoM, current marijuana user, PMHx NSVT, HLD, anomalous RCA who initially presented to outpatient cardiologist, Dr. Morrissey, c/o an episode of chest pressure, palpitations, VANG in January 2023 during a trip in [place] after the patient used illicit drugs, drank strong coffee and had lack of sleep. Patient also reports that during times of exercise he feels chest pressure and palpitations. Patient was referred to Dr. Verde after incidental finding of anomalous RCA on CCTA. Exercise Stress Echo 3/15/23: EF 65%, aortic valve is tricuspid, 1.5mm ST depression in leads II, III and avF, V4 and V5 during peak stress period. CCTA 12/5/23: calcium score 0, RCA has an anomalous take off from the left coronary cusp, no evidence of coronary atherosclerosis. TTE 3/15/23: LVSF normal with EF 66%, mild MR, mildly dilated LA. He underwent cardiac cath on 2/26/24 which revealed normal coronaries. On 2/27/24, he underwent a transposition of the RCA with Dr. Verde. Postoperatively, he arrived to the CTICU on no gtts and extubated. His EKG was consistent with pericarditis and he has been given toradol for this. On POD 1, he was delined but central line remains. Transferred to the [facility]. He was started on a BB yesterday. POD#2 patient Tachycardic this am. BB increased. Started on Medrol dose pack for pericarditis.     Neuro: pain management   - Gabapentin 100 TID  - Oxy, Toradol and Tylenol PRN     CVS: history of NSVT, HLD, anomalous RCA POD#2 from Transposition of the RCA   - Continue ASA and atorvastatin for CAD   - increase Lopressor to 25mg BID   - Solu-medrol for acute pericarditis.     Respiratory: Saturating well on RA   - Wean off O2 sat > 92%  - IS and ambulation  - Chest PT.     GI: passing flatus.  - GI PPX   - Bowel regimen with Senna and Miralax     : BUN/ Creatinine. 8/1.01; Negative 1.7L coming into today  - monitor I/Os.   - autodiuresing   - repleting phosphorus     Endo: FS well controlled.   - ISS     ID: WBC elevated to 15 but getting systemic steroids.   - completed periop ABX   - continue to monitor fever curve     Heme: DVT PPX   - SQH     Dispo plan: home when medically ready.     Mya Stroud PA-C

## 2024-03-01 ENCOUNTER — RESULT REVIEW (OUTPATIENT)
Age: 44
End: 2024-03-01

## 2024-03-01 PROBLEM — Q24.5 MALFORMATION OF CORONARY VESSELS: Chronic | Status: ACTIVE | Noted: 2024-02-21

## 2024-03-01 PROBLEM — Z86.2 PERSONAL HISTORY OF DISEASES OF THE BLOOD AND BLOOD-FORMING ORGANS AND CERTAIN DISORDERS INVOLVING THE IMMUNE MECHANISM: Chronic | Status: ACTIVE | Noted: 2024-02-21

## 2024-03-01 PROBLEM — E78.5 HYPERLIPIDEMIA, UNSPECIFIED: Chronic | Status: ACTIVE | Noted: 2024-02-21

## 2024-03-01 LAB
ALBUMIN SERPL ELPH-MCNC: 3.6 G/DL — SIGNIFICANT CHANGE UP (ref 3.3–5)
ALP SERPL-CCNC: 46 U/L — SIGNIFICANT CHANGE UP (ref 40–120)
ALT FLD-CCNC: 12 U/L — SIGNIFICANT CHANGE UP (ref 10–45)
ANION GAP SERPL CALC-SCNC: 7 MMOL/L — SIGNIFICANT CHANGE UP (ref 5–17)
AST SERPL-CCNC: 18 U/L — SIGNIFICANT CHANGE UP (ref 10–40)
BASOPHILS # BLD AUTO: 0.02 K/UL — SIGNIFICANT CHANGE UP (ref 0–0.2)
BASOPHILS NFR BLD AUTO: 0.2 % — SIGNIFICANT CHANGE UP (ref 0–2)
BILIRUB SERPL-MCNC: 0.2 MG/DL — SIGNIFICANT CHANGE UP (ref 0.2–1.2)
BUN SERPL-MCNC: 7 MG/DL — SIGNIFICANT CHANGE UP (ref 7–23)
CALCIUM SERPL-MCNC: 9.4 MG/DL — SIGNIFICANT CHANGE UP (ref 8.4–10.5)
CHLORIDE SERPL-SCNC: 100 MMOL/L — SIGNIFICANT CHANGE UP (ref 96–108)
CO2 SERPL-SCNC: 28 MMOL/L — SIGNIFICANT CHANGE UP (ref 22–31)
CREAT SERPL-MCNC: 1.02 MG/DL — SIGNIFICANT CHANGE UP (ref 0.5–1.3)
EGFR: 93 ML/MIN/1.73M2 — SIGNIFICANT CHANGE UP
EOSINOPHIL # BLD AUTO: 0.04 K/UL — SIGNIFICANT CHANGE UP (ref 0–0.5)
EOSINOPHIL NFR BLD AUTO: 0.4 % — SIGNIFICANT CHANGE UP (ref 0–6)
GLUCOSE SERPL-MCNC: 172 MG/DL — HIGH (ref 70–99)
HCT VFR BLD CALC: 29.8 % — LOW (ref 39–50)
HGB BLD-MCNC: 9.2 G/DL — LOW (ref 13–17)
IMM GRANULOCYTES NFR BLD AUTO: 0.6 % — SIGNIFICANT CHANGE UP (ref 0–0.9)
LYMPHOCYTES # BLD AUTO: 1.05 K/UL — SIGNIFICANT CHANGE UP (ref 1–3.3)
LYMPHOCYTES # BLD AUTO: 9.6 % — LOW (ref 13–44)
MAGNESIUM SERPL-MCNC: 1.9 MG/DL — SIGNIFICANT CHANGE UP (ref 1.6–2.6)
MCHC RBC-ENTMCNC: 21.9 PG — LOW (ref 27–34)
MCHC RBC-ENTMCNC: 30.9 GM/DL — LOW (ref 32–36)
MCV RBC AUTO: 71 FL — LOW (ref 80–100)
MONOCYTES # BLD AUTO: 0.63 K/UL — SIGNIFICANT CHANGE UP (ref 0–0.9)
MONOCYTES NFR BLD AUTO: 5.8 % — SIGNIFICANT CHANGE UP (ref 2–14)
NEUTROPHILS # BLD AUTO: 9.14 K/UL — HIGH (ref 1.8–7.4)
NEUTROPHILS NFR BLD AUTO: 83.4 % — HIGH (ref 43–77)
NRBC # BLD: 0 /100 WBCS — SIGNIFICANT CHANGE UP (ref 0–0)
PHOSPHATE SERPL-MCNC: 2.1 MG/DL — LOW (ref 2.5–4.5)
PLATELET # BLD AUTO: 250 K/UL — SIGNIFICANT CHANGE UP (ref 150–400)
POTASSIUM SERPL-MCNC: 4.1 MMOL/L — SIGNIFICANT CHANGE UP (ref 3.5–5.3)
POTASSIUM SERPL-SCNC: 4.1 MMOL/L — SIGNIFICANT CHANGE UP (ref 3.5–5.3)
PROT SERPL-MCNC: 6.5 G/DL — SIGNIFICANT CHANGE UP (ref 6–8.3)
RBC # BLD: 4.2 M/UL — SIGNIFICANT CHANGE UP (ref 4.2–5.8)
RBC # FLD: 14 % — SIGNIFICANT CHANGE UP (ref 10.3–14.5)
SODIUM SERPL-SCNC: 135 MMOL/L — SIGNIFICANT CHANGE UP (ref 135–145)
WBC # BLD: 10.95 K/UL — HIGH (ref 3.8–10.5)
WBC # FLD AUTO: 10.95 K/UL — HIGH (ref 3.8–10.5)

## 2024-03-01 PROCEDURE — 71045 X-RAY EXAM CHEST 1 VIEW: CPT | Mod: 26,76

## 2024-03-01 PROCEDURE — 93306 TTE W/DOPPLER COMPLETE: CPT | Mod: 26

## 2024-03-01 RX ORDER — MAGNESIUM OXIDE 400 MG ORAL TABLET 241.3 MG
800 TABLET ORAL ONCE
Refills: 0 | Status: COMPLETED | OUTPATIENT
Start: 2024-03-01 | End: 2024-03-01

## 2024-03-01 RX ORDER — METOPROLOL TARTRATE 50 MG
25 TABLET ORAL ONCE
Refills: 0 | Status: COMPLETED | OUTPATIENT
Start: 2024-03-01 | End: 2024-03-01

## 2024-03-01 RX ADMIN — GABAPENTIN 100 MILLIGRAM(S): 400 CAPSULE ORAL at 13:11

## 2024-03-01 RX ADMIN — Medication 500 MILLIGRAM(S): at 06:50

## 2024-03-01 RX ADMIN — MAGNESIUM OXIDE 400 MG ORAL TABLET 800 MILLIGRAM(S): 241.3 TABLET ORAL at 09:15

## 2024-03-01 RX ADMIN — ATORVASTATIN CALCIUM 40 MILLIGRAM(S): 80 TABLET, FILM COATED ORAL at 21:30

## 2024-03-01 RX ADMIN — Medication 25 MILLIGRAM(S): at 06:51

## 2024-03-01 RX ADMIN — SODIUM CHLORIDE 3 MILLILITER(S): 9 INJECTION INTRAMUSCULAR; INTRAVENOUS; SUBCUTANEOUS at 21:38

## 2024-03-01 RX ADMIN — Medication 81 MILLIGRAM(S): at 12:00

## 2024-03-01 RX ADMIN — Medication 975 MILLIGRAM(S): at 02:30

## 2024-03-01 RX ADMIN — Medication 4 MILLIGRAM(S): at 13:11

## 2024-03-01 RX ADMIN — Medication 4 MILLIGRAM(S): at 18:06

## 2024-03-01 RX ADMIN — Medication 30 MILLIGRAM(S): at 12:16

## 2024-03-01 RX ADMIN — Medication 4 MILLIGRAM(S): at 06:50

## 2024-03-01 RX ADMIN — Medication 975 MILLIGRAM(S): at 18:45

## 2024-03-01 RX ADMIN — Medication 25 MILLIGRAM(S): at 18:05

## 2024-03-01 RX ADMIN — Medication 975 MILLIGRAM(S): at 18:05

## 2024-03-01 RX ADMIN — SODIUM CHLORIDE 3 MILLILITER(S): 9 INJECTION INTRAMUSCULAR; INTRAVENOUS; SUBCUTANEOUS at 13:42

## 2024-03-01 RX ADMIN — Medication 4 MILLIGRAM(S): at 21:30

## 2024-03-01 RX ADMIN — HEPARIN SODIUM 5000 UNIT(S): 5000 INJECTION INTRAVENOUS; SUBCUTANEOUS at 13:11

## 2024-03-01 RX ADMIN — OXYCODONE HYDROCHLORIDE 5 MILLIGRAM(S): 5 TABLET ORAL at 09:15

## 2024-03-01 RX ADMIN — MUPIROCIN 1 APPLICATION(S): 20 OINTMENT TOPICAL at 18:06

## 2024-03-01 RX ADMIN — OXYCODONE HYDROCHLORIDE 5 MILLIGRAM(S): 5 TABLET ORAL at 08:23

## 2024-03-01 RX ADMIN — Medication 975 MILLIGRAM(S): at 10:00

## 2024-03-01 RX ADMIN — SODIUM CHLORIDE 3 MILLILITER(S): 9 INJECTION INTRAMUSCULAR; INTRAVENOUS; SUBCUTANEOUS at 05:21

## 2024-03-01 RX ADMIN — Medication 975 MILLIGRAM(S): at 14:00

## 2024-03-01 RX ADMIN — HEPARIN SODIUM 5000 UNIT(S): 5000 INJECTION INTRAVENOUS; SUBCUTANEOUS at 06:50

## 2024-03-01 RX ADMIN — PANTOPRAZOLE SODIUM 40 MILLIGRAM(S): 20 TABLET, DELAYED RELEASE ORAL at 06:50

## 2024-03-01 RX ADMIN — MUPIROCIN 1 APPLICATION(S): 20 OINTMENT TOPICAL at 07:20

## 2024-03-01 RX ADMIN — Medication 500 MILLIGRAM(S): at 18:06

## 2024-03-01 RX ADMIN — Medication 30 MILLIGRAM(S): at 13:00

## 2024-03-01 RX ADMIN — GABAPENTIN 100 MILLIGRAM(S): 400 CAPSULE ORAL at 21:30

## 2024-03-01 RX ADMIN — HEPARIN SODIUM 5000 UNIT(S): 5000 INJECTION INTRAVENOUS; SUBCUTANEOUS at 21:30

## 2024-03-01 RX ADMIN — Medication 975 MILLIGRAM(S): at 13:11

## 2024-03-01 RX ADMIN — Medication 25 MILLIGRAM(S): at 13:11

## 2024-03-01 RX ADMIN — Medication 975 MILLIGRAM(S): at 09:15

## 2024-03-01 NOTE — CHART NOTE - NSCHARTNOTEFT_GEN_A_CORE
Patient seen and examined at bedside.  Case discussed with Dr. Verde. Minimal output from drains.  No air leak appreciated.  Per Dr. Verde's request, all drains removed and tie down secured without incident.  Occlusive DSD placed.  Patient tolerated procedure well.  Chest Xray pending.

## 2024-03-01 NOTE — PROGRESS NOTE ADULT - ASSESSMENT
44 yoM, current marijuana user, PMHx NSVT, HLD, anomalous RCA who initially presented to outpatient cardiologist, Dr. Morrissey, c/o an episode of chest pressure, palpitations, VANG in January 2023 during a trip in [place] after the patient used illicit drugs, drank strong coffee and had lack of sleep. Patient also reports that during times of exercise he feels chest pressure and palpitations. Patient was referred to Dr. Verde after incidental finding of anomalous RCA on CCTA. Exercise Stress Echo 3/15/23: EF 65%, aortic valve is tricuspid, 1.5mm ST depression in leads II, III and avF, V4 and V5 during peak stress period. CCTA 12/5/23: calcium score 0, RCA has an anomalous take off from the left coronary cusp, no evidence of coronary atherosclerosis. TTE 3/15/23: LVSF normal with EF 66%, mild MR, mildly dilated LA. He underwent cardiac cath on 2/26/24 which revealed normal coronaries. On 2/27/24, he underwent a transposition of the RCA with Dr. Verde. Postoperatively, he arrived to the CTICU on no gtts and extubated. His EKG was consistent with pericarditis and he has been given toradol for this. On POD 1, he was delined but central line remains. Transferred to the [facility]. He was started on a BB yesterday. POD#2 patient Tachycardic this am. BB increased. Started on Medrol dose pack for pericarditis.     Neuro: pain management   - Gabapentin 100 TID  - Oxy, Toradol and Tylenol PRN     CVS: history of NSVT, HLD, anomalous RCA POD#3 from Transposition of the RCA   - Continue ASA and atorvastatin for CAD   - tolerating Lopressor to 25mg BID, will increase to TID  -medrol dose pack for acute pericarditis.     Respiratory: Saturating well on RA   - Wean off O2 sat > 92%  - IS and ambulation  - Chest PT.     GI: passing flatus.  - GI PPX   - Bowel regimen with Senna and Miralax     : BUN/ Creatinine. 7/1.02; Negative 300cc coming into today  - monitor I/Os.   - autodiuresing   - repleting phosphorus     Endo: FS well controlled.   - ISS     ID: WBC decreased to 10.95, on medrol dose pack  - completed periop ABX   - continue to monitor fever curve     Heme: DVT PPX   - SQH     Dispo plan: home when medically ready. 44 yoM, current marijuana user, PMHx NSVT, HLD, anomalous RCA who initially presented to outpatient cardiologist, Dr. Morrissey, c/o an episode of chest pressure, palpitations, VANG in January 2023 during a trip in [place] after the patient used illicit drugs, drank strong coffee and had lack of sleep. Patient also reports that during times of exercise he feels chest pressure and palpitations. Patient was referred to Dr. Verde after incidental finding of anomalous RCA on CCTA. Exercise Stress Echo 3/15/23: EF 65%, aortic valve is tricuspid, 1.5mm ST depression in leads II, III and avF, V4 and V5 during peak stress period. CCTA 12/5/23: calcium score 0, RCA has an anomalous take off from the left coronary cusp, no evidence of coronary atherosclerosis. TTE 3/15/23: LVSF normal with EF 66%, mild MR, mildly dilated LA. He underwent cardiac cath on 2/26/24 which revealed normal coronaries. On 2/27/24, he underwent a transposition of the RCA with Dr. Verde. Postoperatively, he arrived to the CTICU on no gtts and extubated. His EKG was consistent with pericarditis and he has been given toradol for this. On POD 1, he was delined but central line remains. Transferred to the [facility]. He was started on a BB yesterday. POD#2 patient Tachycardic this am. BB increased. Started on Medrol dose pack for pericarditis.  POD 3 Drains removed, ECHO stable, planning for home later today vs tomorrow.    Neuro: pain management   - Gabapentin 100 TID  - Oxy, Toradol and Tylenol PRN     CVS: history of NSVT, HLD, anomalous RCA POD#3 from Transposition of the RCA   - Continue ASA and atorvastatin for CAD   - tolerating Lopressor to 25mg BID, will increase to TID  -medrol dose pack for acute pericarditis.     Respiratory: Saturating well on RA   - Wean off O2 sat > 92%  - IS and ambulation  - Chest PT.     GI: passing flatus.  - GI PPX   - Bowel regimen with Senna and Miralax     : BUN/ Creatinine. 7/1.02; Negative 300cc coming into today  - monitor I/Os.   - autodiuresing   - repleting phosphorus     Endo: FS well controlled.   - ISS     ID: WBC decreased to 10.95, on medrol dose pack  - completed periop ABX   - continue to monitor fever curve     Heme: DVT PPX   - SQH     Dispo plan: home when medically ready.

## 2024-03-01 NOTE — CHART NOTE - NSCHARTNOTEFT_GEN_A_CORE
Patient was placed in bed and remained on cardiac monitor, per Dr. Verde, wire was gently retracted and removed.  No complications.  No blood loss.  Patient did not describe any symptoms of SOB or chest pain.

## 2024-03-01 NOTE — PROGRESS NOTE ADULT - SUBJECTIVE AND OBJECTIVE BOX
Patient discussed on morning rounds with Dr. Verde    Operation / Date: Transposition of RCA, EF 65%    SUBJECTIVE ASSESSMENT:  44y Male seen and examined at bedside.  Patient complaining of chest tubes.  Denies chest pain, shortness of breath, nausea, vomiting.        Vital Signs Last 24 Hrs  T(C): 36.7 (01 Mar 2024 09:09), Max: 37.2 (29 Feb 2024 17:30)  T(F): 98.1 (01 Mar 2024 09:09), Max: 98.9 (29 Feb 2024 17:30)  HR: 84 (01 Mar 2024 10:43) (84 - 110)  BP: 125/70 (01 Mar 2024 10:43) (118/66 - 133/82)  BP(mean): 91 (01 Mar 2024 10:43) (84 - 101)  RR: 18 (01 Mar 2024 10:43) (15 - 19)  SpO2: 97% (01 Mar 2024 10:43) (96% - 98%)    Parameters below as of 01 Mar 2024 10:43  Patient On (Oxygen Delivery Method): room air      I&O's Detail    29 Feb 2024 07:01  -  01 Mar 2024 07:00  --------------------------------------------------------  IN:    IV PiggyBack: 500 mL    Oral Fluid: 740 mL  Total IN: 1240 mL    OUT:    Drain (mL): 25 mL    Drain (mL): 100 mL    Voided (mL): 1400 mL  Total OUT: 1525 mL    Total NET: -285 mL          CHEST TUBE:  No.   SHARON DRAIN:  No.  EPICARDIAL WIRES:No.  TIE DOWNS: No.  JAEGER: No.    PHYSICAL EXAM:    GEN: NAD, looks comfortable  Neuro: A&Ox3.  No focal deficits.  Moving all extremities.   CV: S1S2, regular, no murmurs appreciated.  No carotid bruits.  No JVD  Lungs: decreased lung sounds bilaterally. No wheezing, rales or rhonchi  ABD: Soft, non-tender, non-distended.  +Bowel sounds  EXT: Warm and well perfused.  No peripheral edema noted. All Distal pulses palpable bilaterally.   Musculoskeletal: Moving all extremities with normal ROM, no joint swelling  Incisions: MSI c/d/i chest tube sites c/d/i      LABS:                        9.2    10.95 )-----------( 250      ( 01 Mar 2024 05:24 )             29.8       COUMADIN:  No. REASON: .    PT/INR - ( 29 Feb 2024 09:58 )   PT: 10.7 sec;   INR: 0.94          PTT - ( 29 Feb 2024 09:58 )  PTT:29.3 sec    03-01    135  |  100  |  7   ----------------------------<  172<H>  4.1   |  28  |  1.02    Ca    9.4      01 Mar 2024 05:24  Phos  2.1     03-01  Mg     1.9     03-01    TPro  6.5  /  Alb  3.6  /  TBili  0.2  /  DBili  x   /  AST  18  /  ALT  12  /  AlkPhos  46  03-01      Urinalysis Basic - ( 01 Mar 2024 05:24 )    Color: x / Appearance: x / SG: x / pH: x  Gluc: 172 mg/dL / Ketone: x  / Bili: x / Urobili: x   Blood: x / Protein: x / Nitrite: x   Leuk Esterase: x / RBC: x / WBC x   Sq Epi: x / Non Sq Epi: x / Bacteria: x        MEDICATIONS  (STANDING):  acetaminophen     Tablet .. 975 milliGRAM(s) Oral every 6 hours  ascorbic acid 500 milliGRAM(s) Oral two times a day  aspirin enteric coated 81 milliGRAM(s) Oral daily  atorvastatin 40 milliGRAM(s) Oral at bedtime  chlorhexidine 2% Cloths 1 Application(s) Topical daily  gabapentin 100 milliGRAM(s) Oral three times a day  heparin   Injectable 5000 Unit(s) SubCutaneous every 8 hours  methylPREDNISolone   Oral   methylPREDNISolone 4 milliGRAM(s) Oral at bedtime  methylPREDNISolone 4 milliGRAM(s) Oral before breakfast  methylPREDNISolone 4 milliGRAM(s) Oral after lunch  methylPREDNISolone 4 milliGRAM(s) Oral after dinner  metoprolol tartrate 25 milliGRAM(s) Oral two times a day  mupirocin 2% Ointment 1 Application(s) Both Nostrils two times a day  pantoprazole    Tablet 40 milliGRAM(s) Oral before breakfast  polyethylene glycol 3350 17 Gram(s) Oral daily  senna 2 Tablet(s) Oral at bedtime  sodium chloride 0.9% lock flush 3 milliLiter(s) IV Push every 8 hours    MEDICATIONS  (PRN):  ketorolac   Injectable 30 milliGRAM(s) IV Push every 6 hours PRN Severe Pain (7 - 10)  oxyCODONE    IR 5 milliGRAM(s) Oral every 6 hours PRN Moderate Pain (4 - 6)        RADIOLOGY & ADDITIONAL TESTS:  3/1/24 stable after chest tube removal   Patient discussed on morning rounds with Dr. Verde    Operation / Date: Transposition of RCA, EF 65%    SUBJECTIVE ASSESSMENT:  44y Male seen and examined at bedside.  Patient complaining of chest tubes.  Denies chest pain, shortness of breath, nausea, vomiting.        Vital Signs Last 24 Hrs  T(C): 36.7 (01 Mar 2024 09:09), Max: 37.2 (29 Feb 2024 17:30)  T(F): 98.1 (01 Mar 2024 09:09), Max: 98.9 (29 Feb 2024 17:30)  HR: 84 (01 Mar 2024 10:43) (84 - 110)  BP: 125/70 (01 Mar 2024 10:43) (118/66 - 133/82)  BP(mean): 91 (01 Mar 2024 10:43) (84 - 101)  RR: 18 (01 Mar 2024 10:43) (15 - 19)  SpO2: 97% (01 Mar 2024 10:43) (96% - 98%)    Parameters below as of 01 Mar 2024 10:43  Patient On (Oxygen Delivery Method): room air      I&O's Detail    29 Feb 2024 07:01  -  01 Mar 2024 07:00  --------------------------------------------------------  IN:    IV PiggyBack: 500 mL    Oral Fluid: 740 mL  Total IN: 1240 mL    OUT:    Drain (mL): 25 mL    Drain (mL): 100 mL    Voided (mL): 1400 mL  Total OUT: 1525 mL    Total NET: -285 mL          CHEST TUBE:  No.   SHARON DRAIN:  No.  EPICARDIAL WIRES:No.  TIE DOWNS: No.  JAEGER: No.    PHYSICAL EXAM:    GEN: NAD, looks comfortable  Neuro: A&Ox3.  No focal deficits.  Moving all extremities.   CV: S1S2, regular, no murmurs appreciated.  No carotid bruits.  No JVD  Lungs: decreased lung sounds bilaterally. No wheezing, rales or rhonchi  ABD: Soft, non-tender, non-distended.  +Bowel sounds  EXT: Warm and well perfused.  No peripheral edema noted. All Distal pulses palpable bilaterally.   Musculoskeletal: Moving all extremities with normal ROM, no joint swelling  Incisions: MSI c/d/i    LABS:                        9.2    10.95 )-----------( 250      ( 01 Mar 2024 05:24 )             29.8       COUMADIN:  No. REASON: .    PT/INR - ( 29 Feb 2024 09:58 )   PT: 10.7 sec;   INR: 0.94          PTT - ( 29 Feb 2024 09:58 )  PTT:29.3 sec    03-01    135  |  100  |  7   ----------------------------<  172<H>  4.1   |  28  |  1.02    Ca    9.4      01 Mar 2024 05:24  Phos  2.1     03-01  Mg     1.9     03-01    TPro  6.5  /  Alb  3.6  /  TBili  0.2  /  DBili  x   /  AST  18  /  ALT  12  /  AlkPhos  46  03-01      Urinalysis Basic - ( 01 Mar 2024 05:24 )    Color: x / Appearance: x / SG: x / pH: x  Gluc: 172 mg/dL / Ketone: x  / Bili: x / Urobili: x   Blood: x / Protein: x / Nitrite: x   Leuk Esterase: x / RBC: x / WBC x   Sq Epi: x / Non Sq Epi: x / Bacteria: x        MEDICATIONS  (STANDING):  acetaminophen     Tablet .. 975 milliGRAM(s) Oral every 6 hours  ascorbic acid 500 milliGRAM(s) Oral two times a day  aspirin enteric coated 81 milliGRAM(s) Oral daily  atorvastatin 40 milliGRAM(s) Oral at bedtime  chlorhexidine 2% Cloths 1 Application(s) Topical daily  gabapentin 100 milliGRAM(s) Oral three times a day  heparin   Injectable 5000 Unit(s) SubCutaneous every 8 hours  methylPREDNISolone   Oral   methylPREDNISolone 4 milliGRAM(s) Oral at bedtime  methylPREDNISolone 4 milliGRAM(s) Oral before breakfast  methylPREDNISolone 4 milliGRAM(s) Oral after lunch  methylPREDNISolone 4 milliGRAM(s) Oral after dinner  metoprolol tartrate 25 milliGRAM(s) Oral two times a day  mupirocin 2% Ointment 1 Application(s) Both Nostrils two times a day  pantoprazole    Tablet 40 milliGRAM(s) Oral before breakfast  polyethylene glycol 3350 17 Gram(s) Oral daily  senna 2 Tablet(s) Oral at bedtime  sodium chloride 0.9% lock flush 3 milliLiter(s) IV Push every 8 hours    MEDICATIONS  (PRN):  ketorolac   Injectable 30 milliGRAM(s) IV Push every 6 hours PRN Severe Pain (7 - 10)  oxyCODONE    IR 5 milliGRAM(s) Oral every 6 hours PRN Moderate Pain (4 - 6)        RADIOLOGY & ADDITIONAL TESTS:  3/1/24 stable after chest tube removal

## 2024-03-02 ENCOUNTER — TRANSCRIPTION ENCOUNTER (OUTPATIENT)
Age: 44
End: 2024-03-02

## 2024-03-02 VITALS — TEMPERATURE: 98 F

## 2024-03-02 PROCEDURE — 86923 COMPATIBILITY TEST ELECTRIC: CPT

## 2024-03-02 PROCEDURE — 93005 ELECTROCARDIOGRAM TRACING: CPT

## 2024-03-02 PROCEDURE — C1894: CPT

## 2024-03-02 PROCEDURE — 85027 COMPLETE CBC AUTOMATED: CPT

## 2024-03-02 PROCEDURE — 84295 ASSAY OF SERUM SODIUM: CPT

## 2024-03-02 PROCEDURE — 93306 TTE W/DOPPLER COMPLETE: CPT

## 2024-03-02 PROCEDURE — 84443 ASSAY THYROID STIM HORMONE: CPT

## 2024-03-02 PROCEDURE — 83036 HEMOGLOBIN GLYCOSYLATED A1C: CPT

## 2024-03-02 PROCEDURE — 36415 COLL VENOUS BLD VENIPUNCTURE: CPT

## 2024-03-02 PROCEDURE — 82330 ASSAY OF CALCIUM: CPT

## 2024-03-02 PROCEDURE — 93880 EXTRACRANIAL BILAT STUDY: CPT

## 2024-03-02 PROCEDURE — 97116 GAIT TRAINING THERAPY: CPT

## 2024-03-02 PROCEDURE — 85730 THROMBOPLASTIN TIME PARTIAL: CPT

## 2024-03-02 PROCEDURE — 83605 ASSAY OF LACTIC ACID: CPT

## 2024-03-02 PROCEDURE — 71045 X-RAY EXAM CHEST 1 VIEW: CPT

## 2024-03-02 PROCEDURE — 80307 DRUG TEST PRSMV CHEM ANLYZR: CPT

## 2024-03-02 PROCEDURE — 94150 VITAL CAPACITY TEST: CPT

## 2024-03-02 PROCEDURE — C1889: CPT

## 2024-03-02 PROCEDURE — 80061 LIPID PANEL: CPT

## 2024-03-02 PROCEDURE — 83880 ASSAY OF NATRIURETIC PEPTIDE: CPT

## 2024-03-02 PROCEDURE — 84100 ASSAY OF PHOSPHORUS: CPT

## 2024-03-02 PROCEDURE — 82803 BLOOD GASES ANY COMBINATION: CPT

## 2024-03-02 PROCEDURE — 82553 CREATINE MB FRACTION: CPT

## 2024-03-02 PROCEDURE — 85610 PROTHROMBIN TIME: CPT

## 2024-03-02 PROCEDURE — C1887: CPT

## 2024-03-02 PROCEDURE — 80053 COMPREHEN METABOLIC PANEL: CPT

## 2024-03-02 PROCEDURE — 83735 ASSAY OF MAGNESIUM: CPT

## 2024-03-02 PROCEDURE — 97161 PT EVAL LOW COMPLEX 20 MIN: CPT

## 2024-03-02 PROCEDURE — 85025 COMPLETE CBC W/AUTO DIFF WBC: CPT

## 2024-03-02 PROCEDURE — 82962 GLUCOSE BLOOD TEST: CPT

## 2024-03-02 PROCEDURE — 86900 BLOOD TYPING SEROLOGIC ABO: CPT

## 2024-03-02 PROCEDURE — C1769: CPT

## 2024-03-02 PROCEDURE — 86901 BLOOD TYPING SEROLOGIC RH(D): CPT

## 2024-03-02 PROCEDURE — C1751: CPT

## 2024-03-02 PROCEDURE — 71046 X-RAY EXAM CHEST 2 VIEWS: CPT

## 2024-03-02 PROCEDURE — 80048 BASIC METABOLIC PNL TOTAL CA: CPT

## 2024-03-02 PROCEDURE — 82550 ASSAY OF CK (CPK): CPT

## 2024-03-02 PROCEDURE — 81003 URINALYSIS AUTO W/O SCOPE: CPT

## 2024-03-02 PROCEDURE — 94660 CPAP INITIATION&MGMT: CPT

## 2024-03-02 PROCEDURE — 84132 ASSAY OF SERUM POTASSIUM: CPT

## 2024-03-02 PROCEDURE — 86891 AUTOLOGOUS BLOOD OP SALVAGE: CPT

## 2024-03-02 PROCEDURE — 86850 RBC ANTIBODY SCREEN: CPT

## 2024-03-02 RX ORDER — IBUPROFEN 200 MG
1 TABLET ORAL
Qty: 15 | Refills: 0
Start: 2024-03-02 | End: 2024-03-06

## 2024-03-02 RX ORDER — ASPIRIN/CALCIUM CARB/MAGNESIUM 324 MG
1 TABLET ORAL
Qty: 30 | Refills: 0
Start: 2024-03-02 | End: 2024-03-31

## 2024-03-02 RX ORDER — ACETAMINOPHEN 500 MG
2 TABLET ORAL
Qty: 30 | Refills: 0
Start: 2024-03-02 | End: 2024-03-06

## 2024-03-02 RX ORDER — POLYETHYLENE GLYCOL 3350 17 G/17G
17 POWDER, FOR SOLUTION ORAL
Qty: 1 | Refills: 0
Start: 2024-03-02 | End: 2024-03-31

## 2024-03-02 RX ORDER — SENNA PLUS 8.6 MG/1
2 TABLET ORAL
Qty: 60 | Refills: 0
Start: 2024-03-02 | End: 2024-03-31

## 2024-03-02 RX ORDER — ATORVASTATIN CALCIUM 80 MG/1
1 TABLET, FILM COATED ORAL
Qty: 30 | Refills: 0
Start: 2024-03-02 | End: 2024-03-31

## 2024-03-02 RX ORDER — METOPROLOL TARTRATE 50 MG
1 TABLET ORAL
Qty: 60 | Refills: 0
Start: 2024-03-02 | End: 2024-03-31

## 2024-03-02 RX ORDER — PANTOPRAZOLE SODIUM 20 MG/1
1 TABLET, DELAYED RELEASE ORAL
Qty: 30 | Refills: 0
Start: 2024-03-02 | End: 2024-03-31

## 2024-03-02 RX ADMIN — Medication 975 MILLIGRAM(S): at 09:20

## 2024-03-02 RX ADMIN — Medication 500 MILLIGRAM(S): at 05:10

## 2024-03-02 RX ADMIN — Medication 25 MILLIGRAM(S): at 05:10

## 2024-03-02 RX ADMIN — Medication 975 MILLIGRAM(S): at 07:13

## 2024-03-02 RX ADMIN — Medication 81 MILLIGRAM(S): at 12:10

## 2024-03-02 RX ADMIN — Medication 975 MILLIGRAM(S): at 01:46

## 2024-03-02 RX ADMIN — Medication 4 MILLIGRAM(S): at 07:13

## 2024-03-02 RX ADMIN — SODIUM CHLORIDE 3 MILLILITER(S): 9 INJECTION INTRAMUSCULAR; INTRAVENOUS; SUBCUTANEOUS at 07:08

## 2024-03-02 RX ADMIN — GABAPENTIN 100 MILLIGRAM(S): 400 CAPSULE ORAL at 05:10

## 2024-03-02 RX ADMIN — PANTOPRAZOLE SODIUM 40 MILLIGRAM(S): 20 TABLET, DELAYED RELEASE ORAL at 07:13

## 2024-03-02 NOTE — DISCHARGE NOTE PROVIDER - NSDCFUADDAPPT_GEN_ALL_CORE_FT
Please follow up with Dr. Verde on 3/6/24 at 9:30am    Please follow up with DR. Virgil Morrissey on 3/18/24 at 11:45am

## 2024-03-02 NOTE — DISCHARGE NOTE PROVIDER - NSDCFUSCHEDAPPT_GEN_ALL_CORE_FT
Alexis Verde  [facility] Physician [facility]  CTSURG [address]  Scheduled Appointment: 03/06/2024    Virgil Morrissey  [facility] Physician [facility]  HEARTVASC [address]  Scheduled Appointment: 03/18/2024

## 2024-03-02 NOTE — DISCHARGE NOTE PROVIDER - NSDCMRMEDTOKEN_GEN_ALL_CORE_FT
Haja Low Dose 81 mg oral delayed release tablet: 1 tab(s) orally once a day  ibuprofen 400 mg oral tablet: 1 tab(s) orally every 8 hours  Innerclean oral tablet: 2 tab(s) orally once a day (at bedtime)  Lipitor 40 mg oral tablet: 1 tab(s) orally once a day (at bedtime)  Metoprolol Tartrate 25 mg oral tablet: 1 tab(s) orally 2 times a day  MiraLax oral powder for reconstitution: 17 gram(s) orally once a day  Protonix 40 mg oral delayed release tablet: 1 tab(s) orally once a day (before a meal)  Tylenol 325 mg oral tablet: 2 tab(s) orally every 8 hours

## 2024-03-02 NOTE — DISCHARGE NOTE NURSING/CASE MANAGEMENT/SOCIAL WORK - PATIENT PORTAL LINK FT
You can access the FollowMyHealth Patient Portal offered by [facility] by registering at the following website: [email or web address]. By joining "[email or web address]"’s FollowMyHealth portal, you will also be able to view your health information using other applications (apps) compatible with our system.

## 2024-03-02 NOTE — DISCHARGE NOTE PROVIDER - NSDCCPCAREPLAN_GEN_ALL_CORE_FT
PRINCIPAL DISCHARGE DIAGNOSIS  Diagnosis: Anomalous right coronary artery  Assessment and Plan of Treatment:

## 2024-03-02 NOTE — DISCHARGE NOTE PROVIDER - NSDCFUADDINST_GEN_ALL_CORE_FT
-Please follow up with Dr. Verde on 3/6/24 at 9:30am.  The office is located at [facility], 4th floor. Call us with any questions #[phone].    -Walk daily as tolerated and use your incentive spirometer every hour.    -No driving or strenuous activity/exercise for 6 weeks, or until cleared by your surgeon.    -Gently clean your incisions with anti-bacterial soap and water, pat dry.  You may leave them open to air.    -Call your doctor if you have shortness of breath, chest pain not relieved by pain medication, dizziness, fever >101.5, or increased redness or drainage from incisions.

## 2024-03-02 NOTE — DISCHARGE NOTE PROVIDER - CARE PROVIDERS DIRECT ADDRESSES
[email or web address]

## 2024-03-02 NOTE — DISCHARGE NOTE PROVIDER - HOSPITAL COURSE
Patient discussed on morning rounds with Dr. Brandon    Operation Date: 2/27/24    Primary Surgeon/Attending MD: MD Mehreen    Referring Physician: Virgil Morrissey MD  _ _ _ _ _ _ _ _ _ _ _ _  HOSPITAL COURSE:  44 YOM, current marijuana user, PMHx NSVT, HLD, anomalous RCA who initially presented to outpatient cardiologist Dr. Morrissey  c/o an episode of chest pressure, palpitations, VANG in January 2023 during a trip in [place] after the patient used illicit drugs, drank strong coffee and had lack of sleep.  On 2/27/24, patient underwent transposition of RCA with full sternotomy and patient tolerates the procedures well.  For further details, please refer the operative report.  Postoperatively, patient continues his care in the cardiac intensive unit.  Overnight, he was extubated without difficulty.  On POD#1, patient is stepped down for further care.  Patient continues to do well.  His chest tubes and wires were removed on POD#3 and he tolerates well.  On POD#4, patient is hemodynamically stable, afebrile and ambulating without difficulty.  Patient is discharged home for further care.     Physical Exam  CONSTITUTIONAL:  WDWN                                                    NEUROLOGICAL:  Alert and oriented x3, no gross deficits appreciated                   EYES:      PERRL           ENMT:  supple neck, no lymphadenopathy  CARDIOVASCULAR:     RRR, S1S2, no murmur  RESPIRATORY:  equal breath sounds, no rales, no rhonchi, no wheeze  GASTROINTESTINAL:  soft, nontender, positive bowel sounds  : JAEGER + / -    MUSKULOSKELETAL:  moves all extremities  SKIN / BREAST:    no rash seen  EXTREMITIES:  no edema  VASCULAR:    all pulses  intact (radial, femoral, DP/PT)   _ _ _ _ _ _   MEDICATION DISCHARGE CHECKLIST    CABG        [ x] Aspirin, [  ] Contraindicated, Reason_______________________________        [ ] Plavix, [  ] Contraindicated, Reason___n/a____________________________        [ x] Statin, [  ] Contraindicated, Reason_______________________________        [ ] Lasix , [  ] Contraindicated, Reason____n/a___________________________              Duration: _____        [ x] Beta-Blocker, [  ] Contraindicated, Reason_______________________________    _ _ _ _ _ _ _ _ _ _ _ _  Over 35 minutes was spent with the patient reviewing the discharge material including medications, follow up appointments, recovery, concerning symptoms, and how to contact their health care providers if they have questions

## 2024-03-02 NOTE — DISCHARGE NOTE PROVIDER - CARE PROVIDER_API CALL
Alexis Verde  Thoracic and Cardiac Surgery  [address]  [place] [address]  Phone: [phone]  Fax: [phone]  Follow Up Time:     Virgil Morrissey  Cardiovascular Disease  [facility] [facility], [address]  [place] [address]  Phone: [phone]  Fax: [phone]  Follow Up Time:

## 2024-03-02 NOTE — DISCHARGE NOTE NURSING/CASE MANAGEMENT/SOCIAL WORK - NSDCPEFALRISK_GEN_ALL_CORE
For information on Fall & Injury Prevention, visit: [email or web address] OR  [email or web address] OR  https://www.cdc.gov/steadi/patient.html

## 2024-03-02 NOTE — DISCHARGE NOTE PROVIDER - NSDCCPTREATMENT_GEN_ALL_CORE_FT
PRINCIPAL PROCEDURE  Procedure: Repair, anomalous right coronary artery  Findings and Treatment:

## 2024-03-03 ENCOUNTER — TRANSCRIPTION ENCOUNTER (OUTPATIENT)
Age: 44
End: 2024-03-03

## 2024-03-04 ENCOUNTER — NON-APPOINTMENT (OUTPATIENT)
Age: 44
End: 2024-03-04

## 2024-03-04 ENCOUNTER — APPOINTMENT (OUTPATIENT)
Dept: CARE COORDINATION | Facility: HOME HEALTH | Age: 44
End: 2024-03-04
Payer: COMMERCIAL

## 2024-03-04 VITALS — RESPIRATION RATE: 14 BRPM

## 2024-03-04 DIAGNOSIS — Z09 ENCOUNTER FOR FOLLOW-UP EXAMINATION AFTER COMPLETED TREATMENT FOR CONDITIONS OTHER THAN MALIGNANT NEOPLASM: ICD-10-CM

## 2024-03-04 DIAGNOSIS — Z98.890 OTHER SPECIFIED POSTPROCEDURAL STATES: ICD-10-CM

## 2024-03-04 PROCEDURE — 99024 POSTOP FOLLOW-UP VISIT: CPT

## 2024-03-04 RX ORDER — ATORVASTATIN CALCIUM 20 MG/1
20 TABLET, FILM COATED ORAL
Refills: 0 | Status: COMPLETED | COMMUNITY
End: 2024-03-04

## 2024-03-04 NOTE — HISTORY OF PRESENT ILLNESS
[Home] : at home, [unfilled] , at the time of the visit. [Other Location: e.g. Home (Enter Location, City,State)___] : at [unfilled] [Verbal consent obtained from patient] : the patient, [unfilled] [FreeTextEntry1] : 44 YOM, current marijuana user, PMHx NSVT, HLD, anomalous RCA who initially presented to outpatient cardiologist Dr. Morrissey  c/o an episode of chest pressure, palpitations, VANG in January 2023 during a trip in [place] after the patient used illicit drugs, drank strong coffee and had lack of sleep.  On 2/27/24, patient underwent transposition of RCA with full sternotomy and patient tolerates the procedures well.  For further details, please refer the operative report.  Postoperatively, patient continues his care in the cardiac intensive unit.  Overnight, he was extubated without difficulty.  On POD#1, patient is stepped down for further care.  Patient continues to do well.  His chest tubes and wires were removed on POD#3 and he tolerates well.  On POD#4, patient is hemodynamically stable, afebrile and ambulating without difficulty. Patient is discharged home for further care.  Mr. Roberts is recovering at home. He is ambulating independently.  Mr. Roberts denies dizziness, SOB at rest, palpitations, constipation, difficulty urinating, and LE swelling.  Pt verbalized incisional pain is alleviated with Ibuprofen and Tylenol prn.  Pt c/o soreness to RUQ of abdomen intermittently, denies at time of telehealth visit.  Last BM this am.  [facility] initiated care.    [Dyslipidemia] : Dyslipidemia

## 2024-03-04 NOTE — PLAN
[TextEntry] : Post Operative Care:  Pt advised of importance of daily weights. Pt advised to call [facility] NP for weight gain of 3 lbs or more. Pt instructed on how to use incentive spirometer every hour, demonstrated proper use.  Pt encouraged to ambulate as much as tolerated, avoiding extreme temperatures outdoors.  Also advised to cleanse incisions daily with mild soap and water and to avoid lotions, powders, ointments or creams near or on the incision.  Low salt, low fat diet encouraged and discussed.  Pt advised to avoid heavy lifting or straining. Pt advised no driving until cleared by Dr. Verde.     Follow Your Heart team will continue to follow up with pt status.  NP/CCC roles explained with pt understanding, contact information provided. Pt agrees to call with any questions, issues or concerns.  Worsening symptoms reviewed with patient understanding.      FOLLOW UP APPOINTMENTS:  CTS: Dr. Verde appointment 3/6/24  CARDIOLOGIST: Dr. Morrissey appointment 3/18/24  PCP: Pt encouraged to follow up within one month of discharge

## 2024-03-04 NOTE — PHYSICAL EXAM
[Sclera] : the sclera and conjunctiva were normal [Neck Appearance] : the appearance of the neck was normal [PERRL With Normal Accommodation] : pupils were equal in size, round, and reactive to light [Exaggerated Use Of Accessory Muscles For Inspiration] : no accessory muscle use [Respiration, Rhythm And Depth] : normal respiratory rhythm and effort [Examination Of The Chest] : the chest was normal in appearance [Diminished Respiratory Excursion] : normal chest expansion [Chest Visual Inspection Thoracic Asymmetry] : no chest asymmetry [Nail Clubbing] : no clubbing  or cyanosis of the fingernails [Involuntary Movements] : no involuntary movements were seen [Skin Color & Pigmentation] : normal skin color and pigmentation [] : no rash [Skin Turgor] : normal skin turgor [FreeTextEntry1] : CT sites x 3 clean, dry, and intact. No LE edema on visualization. [No Focal Deficits] : no focal deficits [Impaired Insight] : insight and judgment were intact [Oriented To Time, Place, And Person] : oriented to person, place, and time [Affect] : the affect was normal [Memory Remote] : remote memory was not impaired [Mood] : the mood was normal [Memory Recent] : recent memory was not impaired

## 2024-03-04 NOTE — REVIEW OF SYSTEMS
[Shortness Of Breath] : no shortness of breath [Wheezing] : no wheezing [SOB on Exertion] : shortness of breath during exertion [Cough] : cough [Orthopnea] : no orthopnea [PND] : no PND [As Noted in HPI] : as noted in HPI [Negative] : Neurological

## 2024-03-06 ENCOUNTER — APPOINTMENT (OUTPATIENT)
Dept: CARDIOTHORACIC SURGERY | Facility: CLINIC | Age: 44
End: 2024-03-06
Payer: COMMERCIAL

## 2024-03-06 ENCOUNTER — OUTPATIENT (OUTPATIENT)
Dept: OUTPATIENT SERVICES | Facility: HOSPITAL | Age: 44
LOS: 1 days | End: 2024-03-06
Payer: COMMERCIAL

## 2024-03-06 VITALS
RESPIRATION RATE: 15 BRPM | TEMPERATURE: 97.2 F | DIASTOLIC BLOOD PRESSURE: 61 MMHG | HEART RATE: 95 BPM | OXYGEN SATURATION: 95 % | WEIGHT: 184 LBS | BODY MASS INDEX: 23.62 KG/M2 | SYSTOLIC BLOOD PRESSURE: 114 MMHG

## 2024-03-06 PROCEDURE — 71046 X-RAY EXAM CHEST 2 VIEWS: CPT | Mod: 26

## 2024-03-06 PROCEDURE — 71046 X-RAY EXAM CHEST 2 VIEWS: CPT

## 2024-03-06 PROCEDURE — 99024 POSTOP FOLLOW-UP VISIT: CPT

## 2024-03-07 DIAGNOSIS — D62 ACUTE POSTHEMORRHAGIC ANEMIA: ICD-10-CM

## 2024-03-07 DIAGNOSIS — Q24.5 MALFORMATION OF CORONARY VESSELS: ICD-10-CM

## 2024-03-07 DIAGNOSIS — F17.290 NICOTINE DEPENDENCE, OTHER TOBACCO PRODUCT, UNCOMPLICATED: ICD-10-CM

## 2024-03-07 DIAGNOSIS — E78.5 HYPERLIPIDEMIA, UNSPECIFIED: ICD-10-CM

## 2024-03-07 DIAGNOSIS — R06.89 OTHER ABNORMALITIES OF BREATHING: ICD-10-CM

## 2024-03-07 DIAGNOSIS — F10.90 ALCOHOL USE, UNSPECIFIED, UNCOMPLICATED: ICD-10-CM

## 2024-03-07 DIAGNOSIS — I31.9 DISEASE OF PERICARDIUM, UNSPECIFIED: ICD-10-CM

## 2024-03-07 DIAGNOSIS — F12.99 CANNABIS USE, UNSPECIFIED WITH UNSPECIFIED CANNABIS-INDUCED DISORDER: ICD-10-CM

## 2024-03-07 DIAGNOSIS — I47.20 VENTRICULAR TACHYCARDIA, UNSPECIFIED: ICD-10-CM

## 2024-03-07 NOTE — PHYSICAL EXAM
[] : no respiratory distress [Auscultation Breath Sounds / Voice Sounds] : lungs were clear to auscultation bilaterally [Heart Rate And Rhythm] : heart rate was normal and rhythm regular [Heart Sounds] : normal S1 and S2 [Heart Sounds Gallop] : no gallops [Murmurs] : no murmurs [Heart Sounds Pericardial Friction Rub] : no pericardial rub [Dry] : dry [Clean] : clean [Healing Well] : healing well [No Edema] : no edema

## 2024-03-07 NOTE — ASSESSMENT
[FreeTextEntry1] : - Follow up with PCP/Cardiologist. - Continue current medication regimen. - Continue to increase activity and walk daily as tolerated. Continue to use incentive spirometer.  - No driving or strenuous activity for six weeks after surgery. Avoid lifting >10 to 15lbs for first two months after surgery.  - Continue to use compression stockings. Keep legs elevated above heart when resting/sitting/sleeping. - Call MD if you experience fever, fatigue, dizziness, confusion, syncope, shortness of breath, chest pain not relieved with analgesics, increased redness/drainage from incision. - Patient is discharged from CTS service.  - Patient recommended for cardiac rehab to be arranged via his cardiologist.

## 2024-03-07 NOTE — COUNSELING
[Importance of Regular Medical Follow-Up] : the importance of regular medical follow-up [Hygeine (Including Daily Shower)] : hygeine (including daily shower) [No Heavy Lifting] : no heavy lifting (>15-20 lb. for 1 month or 25 lb. for 3 months from date of surgery) [Blood Pressure Control] : blood pressure control [Progressive Ambulation/Activity] : progressive ambulation/activity [S/S of infection] : signs and symptoms of infection (and to whom it should be reported) [Medication/Vitamin/Herb/Food Interaction] : medication/vitamin/herb/food interaction

## 2024-03-07 NOTE — END OF VISIT
[FreeTextEntry3] : I, AUGIE Land personally performed the evaluation and management (E/M) services for this established patient who presents today with (a) new problem(s)/exacerbation of (an) existing condition(s).  That E/M includes conducting the clinically appropriate interval history &/or exam, assessing all new/exacerbated conditions, and establishing a new plan of care.  Today, my WILL, was here to observe &/or participate in the visit & follow plan of care established by me.

## 2024-03-07 NOTE — REASON FOR VISIT
[de-identified] : 2/26/24 [de-identified] : Transposition of aberrant right coronary artery. [de-identified] : Postoperatively, patient continues his care in the cardiac intensive unit.  Overnight, he was extubated without difficulty.  On POD#1, patient is stepped down for further care.  Patient continues to do well.  His chest tubes and wires were removed on POD#3 and he tolerates well.  On POD#4, patient is hemodynamically stable, afebrile and ambulating without difficulty. He was discharged home on 3/2/24.   Patient is recuperating well from surgery. He is ambulating and increasing his activities daily. Patient denies fever, chills, dizziness, syncope, shortness of breath, chest pain, palpitations or peripheral edema.  Chest X ray reviewed today and demonstrates clear lung fields. There is no effusion, infiltrate, or pneumothorax.

## 2024-03-07 NOTE — DISCUSSION/SUMMARY
[Doing Well] : is doing well [Poor Control] : has poor pain control [No Sign of Infection] : is showing no signs of infection

## 2024-03-18 ENCOUNTER — NON-APPOINTMENT (OUTPATIENT)
Age: 44
End: 2024-03-18

## 2024-03-18 ENCOUNTER — APPOINTMENT (OUTPATIENT)
Dept: HEART AND VASCULAR | Facility: CLINIC | Age: 44
End: 2024-03-18
Payer: COMMERCIAL

## 2024-03-18 VITALS
SYSTOLIC BLOOD PRESSURE: 110 MMHG | TEMPERATURE: 97 F | HEIGHT: 74 IN | HEART RATE: 108 BPM | WEIGHT: 185 LBS | OXYGEN SATURATION: 98 % | RESPIRATION RATE: 16 BRPM | BODY MASS INDEX: 23.74 KG/M2 | DIASTOLIC BLOOD PRESSURE: 52 MMHG

## 2024-03-18 PROCEDURE — 93246 EXT ECG>7D<15D RECORDING: CPT

## 2024-03-18 PROCEDURE — 99214 OFFICE O/P EST MOD 30 MIN: CPT

## 2024-03-18 PROCEDURE — 93242 EXT ECG>48HR<7D RECORDING: CPT

## 2024-03-18 PROCEDURE — 93244 EXT ECG>48HR<7D REV&INTERPJ: CPT

## 2024-03-18 RX ORDER — SENNOSIDES 8.6 MG TABLETS 8.6 MG/1
8.6 TABLET ORAL AT BEDTIME
Refills: 0 | Status: DISCONTINUED | COMMUNITY
Start: 2024-03-04 | End: 2024-03-18

## 2024-03-18 RX ORDER — ACETAMINOPHEN 325 MG/1
325 TABLET, FILM COATED ORAL EVERY 8 HOURS
Refills: 0 | Status: DISCONTINUED | COMMUNITY
Start: 2024-03-04 | End: 2024-03-18

## 2024-03-21 LAB
ALBUMIN SERPL ELPH-MCNC: 4.2 G/DL
ALP BLD-CCNC: 71 U/L
ALT SERPL-CCNC: 14 U/L
ANA SER IF-ACNC: NEGATIVE
ANION GAP SERPL CALC-SCNC: 12 MMOL/L
AST SERPL-CCNC: 15 U/L
BASOPHILS # BLD AUTO: 0.09 K/UL
BASOPHILS NFR BLD AUTO: 1.2 %
BILIRUB SERPL-MCNC: 0.4 MG/DL
BUN SERPL-MCNC: 14 MG/DL
CALCIUM SERPL-MCNC: 10.1 MG/DL
CHLORIDE SERPL-SCNC: 105 MMOL/L
CHOLEST SERPL-MCNC: 94 MG/DL
CO2 SERPL-SCNC: 28 MMOL/L
CREAT SERPL-MCNC: 1.28 MG/DL
CRP SERPL-MCNC: 13 MG/L
EGFR: 71 ML/MIN/1.73M2
EOSINOPHIL # BLD AUTO: 0.24 K/UL
EOSINOPHIL NFR BLD AUTO: 3.3 %
ESTIMATED AVERAGE GLUCOSE: 97 MG/DL
GLUCOSE SERPL-MCNC: 105 MG/DL
HBA1C MFR BLD HPLC: 5 %
HCT VFR BLD CALC: 31 %
HDLC SERPL-MCNC: 39 MG/DL
HGB BLD-MCNC: 9.4 G/DL
IMM GRANULOCYTES NFR BLD AUTO: 0.4 %
LDLC SERPL CALC-MCNC: 39 MG/DL
LYMPHOCYTES # BLD AUTO: 1.51 K/UL
LYMPHOCYTES NFR BLD AUTO: 20.7 %
MAN DIFF?: NORMAL
MCHC RBC-ENTMCNC: 22 PG
MCHC RBC-ENTMCNC: 30.3 GM/DL
MCV RBC AUTO: 72.6 FL
MONOCYTES # BLD AUTO: 0.63 K/UL
MONOCYTES NFR BLD AUTO: 8.6 %
NEUTROPHILS # BLD AUTO: 4.8 K/UL
NEUTROPHILS NFR BLD AUTO: 65.8 %
NONHDLC SERPL-MCNC: 55 MG/DL
NT-PROBNP SERPL-MCNC: 206 PG/ML
PLATELET # BLD AUTO: 559 K/UL
POTASSIUM SERPL-SCNC: 4.8 MMOL/L
PROT SERPL-MCNC: 7 G/DL
RBC # BLD: 4.27 M/UL
RBC # FLD: 15 %
SODIUM SERPL-SCNC: 145 MMOL/L
TRIGL SERPL-MCNC: 81 MG/DL
TSH SERPL-ACNC: 1.55 UIU/ML
WBC # FLD AUTO: 7.3 K/UL

## 2024-03-23 ENCOUNTER — APPOINTMENT (OUTPATIENT)
Dept: HEART AND VASCULAR | Facility: CLINIC | Age: 44
End: 2024-03-23
Payer: COMMERCIAL

## 2024-03-25 ENCOUNTER — APPOINTMENT (OUTPATIENT)
Dept: HEART AND VASCULAR | Facility: CLINIC | Age: 44
End: 2024-03-25
Payer: COMMERCIAL

## 2024-03-25 PROCEDURE — 93306 TTE W/DOPPLER COMPLETE: CPT

## 2024-03-27 ENCOUNTER — NON-APPOINTMENT (OUTPATIENT)
Age: 44
End: 2024-03-27

## 2024-03-31 NOTE — DISCUSSION/SUMMARY
[Coronary Artery Disease] : coronary artery disease [Non-Cardiac] : non-cardiac symptoms [Possible Cardiac Ischemia (Intermd Prob)] : possible cardiac ischemia (intermediate probability) [Dietary Modification] : dietary modification [Exercise Regimen] : an exercise regimen [Hyperlipidemia] : hyperlipidemia [Exercise] : exercise [Diet Modification] : diet modification [Compensated] : compensated [Mild Mitral Regurgitation] : mild mitral regurgitation [Sodium Restriction] : sodium restriction [PVCs] : ectopic ventricular beats [Sinus Tachycardia] : sinus tachycardia [SVT] : paroxysmal supraventricular tachycardia [Stable] : stable [Exercise Rehab] : exercise rehabilitation [Patient] : the patient [None] : There are no changes in medication management [de-identified] : min carotid disease b/l [de-identified] : APC

## 2024-03-31 NOTE — HISTORY OF PRESENT ILLNESS
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 Reports pain at the surgical sternal site; overall improving. Able to go up 6 flights of stairs. Wants to return to previous level of activity. Denies any significant chest discomfort, dyspnea, LE edema, PND or syncope. +intermittent palpitations; no dizziness/lightheadedness or syncope. Occasionally uses THC and smokes cigars. No EtOH  Cardiac workup: Telemetry 1/2023: APC/PVC; SR/ST - sx: SVT CPET 3/2023: ischemic myocardial dysfxn; 63% predicted peak VO2 EXSE 3/2023: nl lv sys fxn; nl simon fxn; mild MR; 12:30 min Anup; pos ECG; no ischemia by WM Carotid Duplex 3/2023: min disease b/l CTA 12/2023: calcium score 0; nl LAD/CX; anomalous RCA - L cusp take-off - slit like orifice and inter arterial course; 12 mm liver lesion m liver lesion

## 2024-03-31 NOTE — END OF VISIT
[Time Spent: ___ minutes] : I have spent [unfilled] minutes of time on the encounter. How to get your Coronavirus (COVID-19) Testing Results:   Please be advised that you were tested for the coronavirus (COVID-19) in the Emergency Department at [facility].  You are to maintain self-quarantine procedures for 14 days until instructed otherwise by one of our healthcare agents. Please note that the test may take up to 2-4 days to result.  If you do not hear from us within 72 hours and you'd like to check on your results, you can call on of our coronavirus specialists at [address] [place] [address] (available 24/7).  Please DO NOT call the site where you received the test to obtain your results.

## 2024-03-31 NOTE — ASSESSMENT
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

## 2024-03-31 NOTE — REASON FOR VISIT
[Arrhythmia/ECG Abnorrmalities] : arrhythmia/ECG abnormalities [Structural Heart and Valve Disease] : structural heart and valve disease [Hyperlipidemia] : hyperlipidemia [Coronary Artery Disease] : coronary artery disease [Carotid, Aortic and Peripheral Vascular Disease] : carotid, aortic and peripheral vascular disease [FreeTextEntry3] : Magnolia Rubio [FreeTextEntry1] : follow up cardiac care

## 2024-03-31 NOTE — PHYSICAL EXAM
[Well Developed] : well developed [Well Nourished] : well nourished [No Acute Distress] : no acute distress [Normal Conjunctiva] : normal conjunctiva [No Carotid Bruit] : no carotid bruit [Normal Venous Pressure] : normal venous pressure [Normal S1, S2] : normal S1, S2 [No Rub] : no rub [No Gallop] : no gallop [Good Air Entry] : good air entry [Clear Lung Fields] : clear lung fields [Murmur] : murmur [Soft] : abdomen soft [Non Tender] : non-tender [No Respiratory Distress] : no respiratory distress  [No Masses/organomegaly] : no masses/organomegaly [Normal Bowel Sounds] : normal bowel sounds [No Cyanosis] : no cyanosis [No Edema] : no edema [Normal Gait] : normal gait [No Rash] : no rash [No Clubbing] : no clubbing [No Varicosities] : no varicosities [Moves all extremities] : moves all extremities [No Skin Lesions] : no skin lesions [Normal Speech] : normal speech [No Focal Deficits] : no focal deficits [Normal memory] : normal memory [Alert and Oriented] : alert and oriented [de-identified] : soft sys murmur, surgical site healing well, appears c/d/i

## 2024-04-16 ENCOUNTER — APPOINTMENT (OUTPATIENT)
Dept: HEART AND VASCULAR | Facility: CLINIC | Age: 44
End: 2024-04-16
Payer: COMMERCIAL

## 2024-04-16 VITALS
HEART RATE: 70 BPM | SYSTOLIC BLOOD PRESSURE: 122 MMHG | TEMPERATURE: 97.6 F | DIASTOLIC BLOOD PRESSURE: 75 MMHG | RESPIRATION RATE: 16 BRPM | BODY MASS INDEX: 25.03 KG/M2 | HEIGHT: 74 IN | WEIGHT: 195 LBS | OXYGEN SATURATION: 97 %

## 2024-04-16 DIAGNOSIS — E78.5 HYPERLIPIDEMIA, UNSPECIFIED: ICD-10-CM

## 2024-04-16 PROCEDURE — 99215 OFFICE O/P EST HI 40 MIN: CPT

## 2024-04-16 RX ORDER — KRILL/OM-3/DHA/EPA/PHOSPHO/AST 1000-230MG
81 CAPSULE ORAL
Refills: 0 | Status: DISCONTINUED | COMMUNITY
End: 2024-04-16

## 2024-04-16 RX ORDER — FUROSEMIDE 20 MG/1
20 TABLET ORAL DAILY
Qty: 7 | Refills: 0 | Status: DISCONTINUED | COMMUNITY
Start: 2024-03-11 | End: 2024-04-16

## 2024-04-16 RX ORDER — LORATADINE 5 MG
17 TABLET,CHEWABLE ORAL
Refills: 0 | Status: DISCONTINUED | COMMUNITY
Start: 2024-03-04 | End: 2024-04-16

## 2024-04-16 RX ORDER — IBUPROFEN 600 MG/1
600 TABLET, FILM COATED ORAL 3 TIMES DAILY
Qty: 90 | Refills: 0 | Status: DISCONTINUED | COMMUNITY
Start: 2024-03-04 | End: 2024-04-16

## 2024-04-16 NOTE — PHYSICAL EXAM
[Well Developed] : well developed [Well Nourished] : well nourished [No Acute Distress] : no acute distress [Normal Conjunctiva] : normal conjunctiva [Normal Venous Pressure] : normal venous pressure [No Carotid Bruit] : no carotid bruit [Normal S1, S2] : normal S1, S2 [No Murmur] : no murmur [No Rub] : no rub [No Gallop] : no gallop [Clear Lung Fields] : clear lung fields [Good Air Entry] : good air entry [No Respiratory Distress] : no respiratory distress  [Soft] : abdomen soft [Non Tender] : non-tender [No Masses/organomegaly] : no masses/organomegaly [Normal Bowel Sounds] : normal bowel sounds [Normal Gait] : normal gait [No Edema] : no edema [No Cyanosis] : no cyanosis [No Clubbing] : no clubbing [No Rash] : no rash [Moves all extremities] : moves all extremities [No Focal Deficits] : no focal deficits [Normal Speech] : normal speech [Alert and Oriented] : alert and oriented [Normal memory] : normal memory [de-identified] : scar well healed

## 2024-04-16 NOTE — DISCUSSION/SUMMARY
[FreeTextEntry1] : stable exam/ chart review s/p re implant coronary artery for anomalous coronary may return to work light weight at 2 months increase activities as tolerated at 3 months

## 2024-05-10 NOTE — H&P ADULT - NSHPROSALLOTHERNEGRD_GEN_ALL_CORE
All other review of systems negative, except as noted in HPI
pt reports no diplopia or blurry vision, wears glasses at baseline, pt saccades and smooth pursuits intact

## 2024-06-18 ENCOUNTER — APPOINTMENT (OUTPATIENT)
Dept: HEART AND VASCULAR | Facility: CLINIC | Age: 44
End: 2024-06-18
Payer: COMMERCIAL

## 2024-06-18 VITALS
RESPIRATION RATE: 16 BRPM | WEIGHT: 197 LBS | OXYGEN SATURATION: 98 % | BODY MASS INDEX: 25.28 KG/M2 | HEIGHT: 74 IN | HEART RATE: 68 BPM | DIASTOLIC BLOOD PRESSURE: 58 MMHG | SYSTOLIC BLOOD PRESSURE: 96 MMHG

## 2024-06-18 DIAGNOSIS — Q24.5 MALFORMATION OF CORONARY VESSELS: ICD-10-CM

## 2024-06-18 PROCEDURE — 99214 OFFICE O/P EST MOD 30 MIN: CPT

## 2024-06-18 RX ORDER — METOPROLOL TARTRATE 25 MG/1
25 TABLET, FILM COATED ORAL
Qty: 30 | Refills: 3 | Status: DISCONTINUED | COMMUNITY
End: 2024-06-18

## 2024-06-18 RX ORDER — LIDOCAINE 40 MG/G
4 PATCH TOPICAL
Qty: 5 | Refills: 0 | Status: DISCONTINUED | COMMUNITY
Start: 2024-03-08 | End: 2024-06-18

## 2024-06-18 RX ORDER — OXYCODONE AND ACETAMINOPHEN 5; 325 MG/1; MG/1
5-325 TABLET ORAL
Qty: 30 | Refills: 0 | Status: DISCONTINUED | COMMUNITY
Start: 2024-03-06 | End: 2024-06-18

## 2024-06-18 RX ORDER — ATORVASTATIN CALCIUM 40 MG/1
40 TABLET, FILM COATED ORAL
Refills: 0 | Status: DISCONTINUED | COMMUNITY
Start: 2024-03-04 | End: 2024-06-18

## 2024-06-18 NOTE — PHYSICAL EXAM
[Well Developed] : well developed [Well Nourished] : well nourished [No Acute Distress] : no acute distress [Normal Conjunctiva] : normal conjunctiva [Normal Venous Pressure] : normal venous pressure [No Carotid Bruit] : no carotid bruit [Normal S1, S2] : normal S1, S2 [No Murmur] : no murmur [No Rub] : no rub [No Gallop] : no gallop [Clear Lung Fields] : clear lung fields [Good Air Entry] : good air entry [No Respiratory Distress] : no respiratory distress  [Soft] : abdomen soft [Non Tender] : non-tender [No Masses/organomegaly] : no masses/organomegaly [Normal Bowel Sounds] : normal bowel sounds [Normal Gait] : normal gait [No Edema] : no edema [No Cyanosis] : no cyanosis [No Clubbing] : no clubbing [No Rash] : no rash [Moves all extremities] : moves all extremities [No Focal Deficits] : no focal deficits [Normal Speech] : normal speech [Alert and Oriented] : alert and oriented [Normal memory] : normal memory [de-identified] : scar well healed

## 2024-06-18 NOTE — DISCUSSION/SUMMARY
[FreeTextEntry1] : stable exam s/p coronary re- implant- stable, no restriction to activities, as tolerated

## 2024-10-21 ENCOUNTER — APPOINTMENT (OUTPATIENT)
Dept: HEART AND VASCULAR | Facility: CLINIC | Age: 44
End: 2024-10-21
Payer: COMMERCIAL

## 2024-10-21 ENCOUNTER — NON-APPOINTMENT (OUTPATIENT)
Age: 44
End: 2024-10-21

## 2024-10-21 VITALS
SYSTOLIC BLOOD PRESSURE: 119 MMHG | OXYGEN SATURATION: 98 % | TEMPERATURE: 97.7 F | HEIGHT: 74 IN | RESPIRATION RATE: 16 BRPM | HEART RATE: 80 BPM | BODY MASS INDEX: 23.1 KG/M2 | DIASTOLIC BLOOD PRESSURE: 68 MMHG | WEIGHT: 180 LBS

## 2024-10-21 DIAGNOSIS — R07.89 OTHER CHEST PAIN: ICD-10-CM

## 2024-10-21 DIAGNOSIS — E78.5 HYPERLIPIDEMIA, UNSPECIFIED: ICD-10-CM

## 2024-10-21 DIAGNOSIS — Q24.5 MALFORMATION OF CORONARY VESSELS: ICD-10-CM

## 2024-10-21 PROCEDURE — 99214 OFFICE O/P EST MOD 30 MIN: CPT

## 2025-06-19 ENCOUNTER — APPOINTMENT (OUTPATIENT)
Dept: HEART AND VASCULAR | Facility: CLINIC | Age: 45
End: 2025-06-19

## 2025-07-31 ENCOUNTER — APPOINTMENT (OUTPATIENT)
Dept: HEART AND VASCULAR | Facility: CLINIC | Age: 45
End: 2025-07-31
Payer: COMMERCIAL

## 2025-07-31 VITALS
WEIGHT: 183 LBS | OXYGEN SATURATION: 98 % | BODY MASS INDEX: 23.49 KG/M2 | HEART RATE: 88 BPM | HEIGHT: 74 IN | DIASTOLIC BLOOD PRESSURE: 70 MMHG | SYSTOLIC BLOOD PRESSURE: 116 MMHG | RESPIRATION RATE: 16 BRPM

## 2025-07-31 DIAGNOSIS — R06.09 OTHER FORMS OF DYSPNEA: ICD-10-CM

## 2025-07-31 DIAGNOSIS — Q24.5 MALFORMATION OF CORONARY VESSELS: ICD-10-CM

## 2025-07-31 PROCEDURE — 93306 TTE W/DOPPLER COMPLETE: CPT

## 2025-07-31 PROCEDURE — 99214 OFFICE O/P EST MOD 30 MIN: CPT
